# Patient Record
Sex: FEMALE
[De-identification: names, ages, dates, MRNs, and addresses within clinical notes are randomized per-mention and may not be internally consistent; named-entity substitution may affect disease eponyms.]

---

## 2017-08-24 NOTE — CAT SCAN REPORT
FINAL REPORT



EXAM:  CT HEAD/BRAIN WO CON



HISTORY:  dizziness 



TECHNIQUE:  CT head without contrast 



PRIORS:  None.



FINDINGS:  

No acute intra-axial or extra-axial hemorrhage is identified. 

There is no evidence of midline shift or mass effect.  The

ventricles and sulci are within normal limits. Gray-white matter

differentiation is intact. No acute parenchymal abnormalities

seen. 



Bony calvarium is grossly intact.  Visualized portions of the

mastoids and paranasal sinuses are unremarkable. 



IMPRESSION:  

Negative CT head

## 2017-08-24 NOTE — EMERGENCY DEPARTMENT REPORT
HPI





- General


Chief Complaint: Nausea/Vomiting/Diarrhea


Time Seen by Provider: 17 14:27





- HPI


HPI: 


This is a 46-year-old  female presents to the emergency department 

with what she says is now some dizziness, feeling overheated and shakes.  The 

patient was brought in many hours ago by [facility] but then became 

slightly belligerent to triage and said she was leaving and eloped.  She 

recently came back with these current complaints saying that she just wants to 

get some rest.  There was some talk about hallucinations occurring last night 

but currently she denies any auditory or visual hallucinations or any suicidal 

or homicidal ideations.  She does have a history of depression and 

schizophrenia.  She denies any current past medical history.  She does admit to 

drinking some alcohol last night but denies alcoholism.  She does admit to 

using some "street drugs" but says that she has not done it in the past 1-2 

days.








She be noted that later on on reassessment the patient says that a lot has been 

going on in her life on a personal level and it is causing her to be depressed 

and that she admits to suicidal ideations last night and even through this 

morning and afternoon.  When asked if the patient is having current suicidal 

ideations, she says "I am not sure but I know that I need to see my 

psychiatrist soon."





ED Past Medical Hx





- Past Medical History


Hx Heart Attack/AMI: Yes (pt stated)


Hx Diabetes: Yes (gestational)


Hx Seizures: Yes


Hx Psychiatric Treatment: Yes (depression / schizophrenia)





- Surgical History


Hx Breast Surgery: Yes (breast augmentation)


Additional Surgical History: left leg surgery.   x 2





- Social History


Smoking Status: Current Every Day Smoker


Substance Use Type: Alcohol





ED Review of Systems


ROS: 


Stated complaint: HALLUCINATION/VOMITING


Other details as noted in HPI





Comment: All other systems reviewed and negative


Constitutional: chills.  denies: fever


Eyes: denies: eye pain, eye discharge, vision change


ENT: denies: ear pain, throat pain


Respiratory: denies: cough, shortness of breath, wheezing


Cardiovascular: denies: chest pain, palpitations


Gastrointestinal: denies: abdominal pain, nausea, diarrhea


Musculoskeletal: denies: back pain, joint swelling, arthralgia


Skin: denies: rash, lesions


Neurological: other (dizziness).  denies: headache, weakness, paresthesias





Physical Exam





- Physical Exam


Vital Signs: 


 Vital Signs











  17





  10:53 13:07


 


Temperature 97.9 F 97.8 F


 


Pulse Rate 104 H 109 H


 


Respiratory 16 17





Rate  


 


Blood Pressure  112/77


 


Blood Pressure 122/74 





[Left]  


 


O2 Sat by Pulse 99 100





Oximetry  











Physical Exam: 


GENERAL: The patient is well-developed well-nourished.


HENT: Normocephalic.  Atraumatic.    Patient has moist mucous membranes.


EYES: Extraocular motions are intact.  Pupils equal reactive to light 

bilaterally.  No nystagmus.


NECK: Supple.  Trachea is midline.


CHEST/LUNGS: Clear to auscultation.  There is no respiratory distress noted.


HEART/CARDIOVASCULAR: Regular.  There is no tachycardia.  There is no gallop 

rub or murmur.


ABDOMEN: Abdomen is soft, nontender.  Patient has normal bowel sounds.  There 

is no abdominal distention.


SKIN: Skin is warm and dry.


NEURO: The patient is awake, alert, and oriented.  The patient is cooperative.  

The patient has no focal neurologic deficits.  The patient has normal speech 

and gait.  Cranial nerves II through XII grossly intact.


MUSCULOSKELETAL: There is no tenderness or deformity.  There is no limitation 

range of motion.  There is no evidence of acute injury.








ED Course


 Vital Signs











  17





  10:53 13:07


 


Temperature 97.9 F 97.8 F


 


Pulse Rate 104 H 109 H


 


Respiratory 16 17





Rate  


 


Blood Pressure  112/77


 


Blood Pressure 122/74 





[Left]  


 


O2 Sat by Pulse 99 100





Oximetry  














ED Medical Decision Making





- Lab Data


Result diagrams: 


 17 13:15





 17 13:15





- EKG Data


-: EKG Interpreted by Me


EKG shows normal: sinus rhythm, axis, intervals, QRS complexes (q waves to the 

septal / anterior leads), ST-T waves


Rate: tachycardia (108 bpm)





- EKG Data


When compared to previous EKG there are: previous EKG unavailable


Interpretation: other (sinus tach, q waves to the septal / anterior leads)





- Radiology Data


Radiology results: report reviewed


CT of the head does not show any acute intracranial process including no 

ischemia, shift, mass, bleeding or skull fracture.








- Medical Decision Making


46-year-old female presents to the emergency department originally with some 

complaints of dehydration, feeling overheated, nonspecific dizziness.  She was 

found to have some slight alcohol intoxication earlier that had resolved by the 

time it was rechecked this afternoon.  Urine drug screen positive for 

methamphetamine.  The rest the patient's labs are mostly unremarkable and do 

not show any etiology of her symptoms.  She was given Zofran for nausea and 

vomiting, IV fluid resuscitation.  Eventually patient says that she is feeling 

better and she is hungry and was able to eat something without having any 

further nausea or vomiting.  However when I went to reassess the patient she 

began speaking of her depression and suicidal ideations.  For this reason the 

patient was made a 1013.  She still complained of some nonspecific dizziness so 

a CT scan of the head was done without contrast resulted as a negative 

examination without any signs of bleed, shift, ischemia or any other acute 

process.  Vital signs stable throughout her ED course.  At this point I 

consider the patient to be medically cleared for psychiatric placement.








- Differential Diagnosis


bipolar disorder, schizophrenia, depression, substance abuse, dehydration


Critical Care Time: No


Critical care attestation.: 


If time is entered above; I have spent that time in minutes in the direct care 

of this critically ill patient, excluding procedure time.








ED Disposition


Clinical Impression: 


 Suicidal ideations





Depression


Qualifiers:


 Depression Type: unspecified Qualified Code(s): F32.9 - Major depressive 

disorder, single episode, unspecified





Disposition: DC/TX-[facility] PSY HOSP/PSY UNIT


Is pt being admited?: No


Condition: Stable


Referrals: 


PRIMARY CARE,MD [Primary Care Provider] - 3-5 Days


Time of Disposition: 19:38

## 2017-08-26 NOTE — CONSULTATION
History of Present Illness





- Reason for Consult


Consult date: 08/26/17


Reason for consult: psychiatric evaluation





Medications and Allergies


 Allergies











Allergy/AdvReac Type Severity Reaction Status Date / Time


 


No Known Allergies Allergy   Verified 08/24/17 13:01











 Home Medications











 Medication  Instructions  Recorded  Confirmed  Last Taken  Type


 


hydrOXYzine PAMOATE [Vistaril] 100 mg PO BID 08/25/17 08/25/17 Unknown History


 


risperiDONE [RisperDAL] 2 mg PO QHS 08/25/17 08/25/17 Unknown History











Active Meds: 


Active Medications





Risperidone (Risperdal)  2 mg PO [facility]











Mental Status Exam





- Vital signs


 Last Vital Signs











Temp  98 F   08/26/17 15:11


 


Pulse  88   08/26/17 15:11


 


Resp  18   08/26/17 15:11


 


BP  134/70   08/26/17 15:11


 


Pulse Ox  98   08/26/17 15:11














Results


Result Diagrams: 


 08/24/17 13:15





 08/24/17 13:15


All other labs normal.








Assessment and Plan


Assessment and plan: 


"I need detox."


The patient is a 46-year-old female presenting with a chief complaint of 

suicidal ideation with a hx of substance abuse. She has presented to [facility] more than once in the past 6 months for the same presentation. These 

records are found under a different medical record number. She admits to 

polysubstance use, including heroin, methamphetamine, xanax, and historically 

cocaine. She is tangential and irritable. She reports vomiting yesterday. Her 

primary complaints are sweating, mood swings, and tremors. She reports a 

history of seizures and states at the time she had a low sodium level. She 

reports suicidal ideation. No psychotic symptoms present. She wants to be on 

detox medication and schizophrenia medications. She states the schizophrenia 

medicines have never really worked. Nonetheless, she would like to restart them

, risperdal and vistaril. She wants to be on tranxene and suboxone. 











Past psychiatric history





- Past Medical History


Past Medical History: No medical history, other (Elevated Ch)


Past Surgical History: No surgical history





- past Psychiatric treatment and history


Psych: Bipolar, Depression





- Social History


Social history: staying in [place] and comes to [place] to see her 

boyfriend





Mental Status Exam








- Exam


Narrative exam: 








MSE: 





 Appearance: calm, uncooperative 


 Behavior: good eye contact


 Speech: pressured, loud


 Mood: agitated 


 Affect: congruent


 Thought Process: tangential


 Thought Content: she reports suicidal ideation. denies HI's and AVH's


 Motor Activity: ambulatory 


 Cognition: A&O x 3


 Insight: limited


 Judgment: poor








Assessment and plan: 


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 The patient is a 46-year-old 

female presenting with a chief complaint of suicidal ideation and wants detox 

for substance abuse. She is not exhibiting signs of benzo withdrawal. Her vital 

signs have been within normal limits. She has subjective complaints of 

withdrawal symptoms. She does not appear to be in physical distress. CIWA is 

not indicated at this time. 





DD: R/O Bipolar, Depressive DO





Recommendation/Plan: Continue 1013 with placement to inpatient psy services. 

She wants to go to [facility]. She is on the [facility] board and has been informed 

she is not able to go to [facility]. She has been denied there due to past 

behavioral issues.


Start risperdal 2mg hs for mood


start vistaril 50mg tid for complaints of anxiety.

## 2017-08-27 NOTE — PROGRESS NOTE
Subjective





- Reason for Consult


Consult date: 08/27/17


Reason for consult: follow up





Mental Status Exam





- Vital signs


 Last Vital Signs











Temp  98.3 F   08/27/17 07:48


 


Pulse  85   08/27/17 07:48


 


Resp  16   08/27/17 07:49


 


BP  120/78   08/27/17 07:48


 


Pulse Ox  96   08/27/17 07:49














Assessment and Plan


"I need detox."


The patient is a 46-year-old female initial presented with a chief complaint of 

suicidal ideation and requesting detox from xanax and heroin. She has presented 

to [facility] more than once in the past 6 months for the same 

presentation. These records are found under a different medical record number. 

She admits to polysubstance use, including heroin, methamphetamine, xanax, and 

historically cocaine. Today, she is perseverative about being discharged since 

she cannot go to [facility]. She denies SI/HI/AVH. She states she wants rehab. 

She has not required detox from benzos/alcohol/opiates. She is not in acute 

distress. No physical complaints voiced.





Mental Status Exam








- Exam


Narrative exam: 








MSE: 





 Appearance: calm, uncooperative 


 Behavior: good eye contact


she is perseverative about discharge/demanding at times.


 Speech: regular rate and rhythm


 Mood: irritable


 Affect: congruent


 Thought Process: logical/perseverative


 Thought Content:  denies SI/HI's and AVH's


 Motor Activity: ambulatory 


 Cognition: A&O x 3


 Insight: limited


 Judgment: limited








Assessment and plan: 


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 The patient is a 46-year-old 

female presenting with a chief complaint of suicidal ideation and wants detox 

for substance abuse. She is not exhibiting signs of benzo withdrawal. Her vital 

signs have been within normal limits. 





DD: R/O Bipolar, Depressive DO





Recommendation/Plan: 


continue risperdal 2mg hs for mood


Continue vistaril 50mg tid for complaints of anxiety.








At the time of interview, Ms. Valente does not present as an imminent risk of 

harm to herself or others. She has goal oriented thinking and plans to follow 

up at the [facility] for therapy and medication management. She currently 

does not meet inpatient criteria. She would be appropriate for PHP or [facility] 

services. 





Initial reports of suicidality have not been consistent. Her goal to receive 

treatment for polysubstance use, particularly withdrawal. She is not exhibiting 

signs of withdrawal. She current denies SI/HI. Initial reports are consistent 

with attempts to gain access to inpatient services for alternate services/

substance use services/rehab placement. She had the same presentation 4 times 

since 2/2017.





Consequently, she is no longer a danger to her self and she has consistently 

verbalized thoughts about her future. She plans on going to the [facility] tomorrow. She understands that cocaine, meth, xanax, heroin, and cannabis 

have a deleterious effect on her mental health and she is in the pre-

contemplative stages of quitting. The 1013 has been rescinded and she can 

discharge back in the community with outpatient resources.

## 2018-01-28 NOTE — EMERGENCY DEPARTMENT REPORT
ED ENT HPI





- General


Chief complaint: Earache


Stated complaint: FEVER,LEFT EAR PAIN


Time Seen by Provider: 18 07:14


Source: patient


Mode of arrival: Ambulatory


Limitations: No Limitations





- History of Present Illness


Initial comments: 





This is a 47-year-old female nontoxic, well nourished in appearance, no acute 

signs of distress presents to the ED with c/o of left earache x2 days.  Patient 

is also complaining about chronic intermittent left knee pain status post knee 

surgery 2 years ago.  Patient denies any trauma to the knee.  Patient stated 

pain is aching with level of 8 out of 10.  Patient denies decreased hearing, 

mastoid tenderness, fever, chills, nausea, vomiting, chest pain, shortness of 

breath, headache or stiff neck.  Patient denies any calf pain or calf 

tenderness.  Denies any recent travels, long car rides, or recent hospital 

stays.  Patient denies any numbness or tingling.  Denies any abdominal pain or 

back pain.  Patient denies any drug allergies.  Past medical history includes 

diabetes, MI, psychiatric.


MD complaint: ear pain


-: days(s) (2)


Location: L ear


Severity: mild


Severity scale (0 -10): 8


Quality: aching


Consistency: constant


Improves with: none


Worsens with: none


Associated Symptoms: denies: fever, cough, gum swelling, toothache, pain with 

swallowing, sore throat, tinnitus, hearing loss, discharge from ear, rhinorrhea





- Related Data


 Home Medications











 Medication  Instructions  Recorded  Confirmed  Last Taken


 


hydrOXYzine PAMOATE [Vistaril] 100 mg PO BID 17 Unknown


 


risperiDONE [RisperDAL] 2 mg PO QHS 17 Unknown








 Previous Rx's











 Medication  Instructions  Recorded  Last Taken  Type


 


Sulfamethoxazole/Trimethoprim 1 each PO BID #14 tablet 17 Unknown Rx





[Bactrim DS TAB]    


 


Amoxicillin/K Clav Tab [Augmentin 1 tab PO Q12HR #20 tab 18 Unknown Rx





875 mg]    


 


Ciprofloxacin 0.2%(Nf) 4 drops AS TID 7 Days  droperette 18 Unknown Rx





[Ciprofloxacin Otic 0.2%(Nf)]    











 Allergies











Allergy/AdvReac Type Severity Reaction Status Date / Time


 


No Known Allergies Allergy   Verified 17 13:01














ED Dental HPI





- General


Chief complaint: Earache


Stated complaint: FEVER,LEFT EAR PAIN


Time Seen by Provider: 18 07:14


Source: patient


Mode of arrival: Ambulatory


Limitations: No Limitations





- Related Data


 Home Medications











 Medication  Instructions  Recorded  Confirmed  Last Taken


 


hydrOXYzine PAMOATE [Vistaril] 100 mg PO BID 17 Unknown


 


risperiDONE [RisperDAL] 2 mg PO QHS 17 Unknown








 Previous Rx's











 Medication  Instructions  Recorded  Last Taken  Type


 


Sulfamethoxazole/Trimethoprim 1 each PO BID #14 tablet 17 Unknown Rx





[Bactrim DS TAB]    


 


Amoxicillin/K Clav Tab [Augmentin 1 tab PO Q12HR #20 tab 18 Unknown Rx





875 mg]    


 


Ciprofloxacin 0.2%(Nf) 4 drops AS TID 7 Days  droperette 18 Unknown Rx





[Ciprofloxacin Otic 0.2%(Nf)]    











 Allergies











Allergy/AdvReac Type Severity Reaction Status Date / Time


 


No Known Allergies Allergy   Verified 17 13:01














ED Review of Systems


ROS: 


Stated complaint: FEVER,LEFT EAR PAIN


Other details as noted in HPI





Constitutional: denies: chills, fever


Eyes: denies: eye pain, eye discharge, vision change


ENT: ear pain.  denies: throat pain


Respiratory: denies: cough, shortness of breath, wheezing


Cardiovascular: denies: chest pain, palpitations


Endocrine: no symptoms reported


Gastrointestinal: denies: abdominal pain, nausea, diarrhea


Genitourinary: denies: urgency, dysuria, discharge


Musculoskeletal: arthralgia.  denies: back pain, joint swelling


Skin: denies: rash, lesions


Neurological: denies: headache, weakness, paresthesias


Psychiatric: denies: anxiety, depression


Hematological/Lymphatic: denies: easy bleeding, easy bruising





ED Past Medical Hx





- Past Medical History


Hx Heart Attack/AMI: Yes (pt stated)


Hx Diabetes: Yes (gestational)


Hx Seizures: Yes


Hx Psychiatric Treatment: Yes (depression / schizophrenia)


Additional medical history: High cholesterol





- Surgical History


Hx Breast Surgery: Yes (breast augmentation)


Additional Surgical History: left leg surgery.   x 2





- Social History


Smoking Status: Current Every Day Smoker


Substance Use Type: None





- Medications


Home Medications: 


 Home Medications











 Medication  Instructions  Recorded  Confirmed  Last Taken  Type


 


hydrOXYzine PAMOATE [Vistaril] 100 mg PO BID 17 Unknown History


 


risperiDONE [RisperDAL] 2 mg PO QHS 17 Unknown History


 


Sulfamethoxazole/Trimethoprim 1 each PO BID #14 tablet 17  Unknown Rx





[Bactrim DS TAB]     


 


Amoxicillin/K Clav Tab [Augmentin 1 tab PO Q12HR #20 tab 18  Unknown Rx





875 mg]     


 


Ciprofloxacin 0.2%(Nf) 4 drops AS TID 7 Days  droperette 18  Unknown Rx





[Ciprofloxacin Otic 0.2%(Nf)]     














ED Physical Exam





- General


Limitations: No Limitations


General appearance: alert, in no apparent distress





- Head


Head exam: Present: atraumatic, normocephalic, normal inspection





- Eye


Eye exam: Present: normal appearance, PERRL, EOMI.  Absent: scleral icterus, 

conjunctival injection, nystagmus, periorbital swelling, periorbital tenderness


Pupils: Present: normal accommodation





- ENT


ENT exam: Present: normal orophraynx, mucous membranes moist





- Expanded ENT Exam


  ** Expanded


Ear exam: Present: normal external inspection


TM/Canal exam: Erythema: Left TM, Bulging: Left TM (with tragus pain)


Mouth exam: Present: normal external inspection, tongue normal.  Absent: 

drooling, trismus, muffled voice, tongue elevation, laceration


Teeth exam: Present: normal inspection


Throat exam: Positive: normal inspection.  Negative: tonsillar erythema, 

tonsillomegaly, tonsillar exudate, R peritonsillar mass, L peritonsillar mass





- Neck


Neck exam: Present: normal inspection, full ROM.  Absent: tenderness, 

meningismus, lymphadenopathy, thyromegaly





- Respiratory


Respiratory exam: Present: normal lung sounds bilaterally.  Absent: respiratory 

distress, wheezes, rales, rhonchi, stridor, chest wall tenderness, accessory 

muscle use, decreased breath sounds, prolonged expiratory





- Cardiovascular


Cardiovascular Exam: Present: regular rate, normal rhythm, normal heart sounds.

  Absent: irregular rhythm, systolic murmur, diastolic murmur, rubs, gallop





- GI/Abdominal


GI/Abdominal exam: Present: soft, normal bowel sounds.  Absent: distended, 

tenderness, guarding, rebound, rigid, diminished bowel sounds





- Rectal


Rectal exam: Present: deferred





- Extremities Exam


Extremities exam: Present: normal inspection, full ROM, tenderness, normal 

capillary refill.  Absent: pedal edema, joint swelling, calf tenderness





- Expanded Lower Extremity Exam


  ** Left


Hip exam: Present: normal inspection, full ROM


Upper Leg exam: Present: normal inspection, full ROM


Knee exam: Present: normal inspection, full ROM, tenderness, full knee 

extension.  Absent: swelling, abrasion, laceration, ecchymosis, deformity, 

crepidus, dislocation, erythema, effusion, pain w/ pronation/supination, 

posterior draw sign, pain/laxity with valgus, pain/laxity with varus


Lower Leg exam: Present: normal inspection, full ROM.  Absent: tenderness, 

swelling, abrasion, laceration, ecchymosis, deformity, crepidus, dislocation, 

erythema, palpable cord, Nieves's sign


Ankle exam: Present: normal inspection, full ROM.  Absent: tenderness, swelling

, abrasion, laceration, ecchymosis, deformity, crepidus, dislocation, erythema, 

anterior draw sign


Foot/Toe exam: Present: normal inspection, full ROM


Neuro vascular tendon exam: Present: no vascular compromise.  Absent: pulse 

deficit, abnormal cap refill, motor deficit, sensory deficit, tendon deficit, 

extremity cold to touch, pallor, abnormal 2-point discrimination, decreased fine

/light touch, foot drop, peroneal nerve deficit, significant pain with passive 

ROM of distal joint


Gait: Positive: observed and normal





- Back Exam


Back exam: Present: normal inspection, full ROM.  Absent: tenderness, CVA 

tenderness (R), CVA tenderness (L), muscle spasm, paraspinal tenderness, 

vertebral tenderness, rash noted





- Neurological Exam


Neurological exam: Present: alert, oriented X3, CN II-XII intact, normal gait, 

reflexes normal





- Psychiatric


Psychiatric exam: Present: normal affect, normal mood





- Skin


Skin exam: Present: warm, dry, intact, normal color.  Absent: rash





ED Course





 Vital Signs











  18





  05:55 06:07


 


Temperature 98.1 F 98.1 F


 


Pulse Rate 111 H 81


 


Respiratory 18 18





Rate  


 


Blood Pressure 169/68 169/68


 


O2 Sat by Pulse 97 99





Oximetry  














- Reevaluation(s)


Reevaluation #1: 





18 07:57


Patient is speaking in full sentences with no signs of distress noted.





ED Medical Decision Making





- Medical Decision Making





This is a 47-year-old female that presents with chronic intermittent left knee 

pain, left otitis media and otitis externa.  Patient was stable and was 

examined by me.  The patient received Motrin 800 milligrams by mouth in the ED 

which patient stated his symptoms of knee pain is improving and is subsiding.  

Patient is discharged with Augmentin and ciprofloxacin otic.  There is no 

mastoid tenderness.  No mastoid erythema.  Negative calf tenderness or calf 

pain.  Patient was instructed Follow-up with a ENT/orthopedic doctor in 24 

hours or if symptoms worsen and continue return to emergency room as soon as 

possible.  At time time of discharge, the patient does not seem toxic or ill in 

appearance.  No acute signs of distress noted.  Patient agrees to discharge 

treatment plan of care.  No further questions noted by the patient.


Critical care attestation.: 


If time is entered above; I have spent that time in minutes in the direct care 

of this critically ill patient, excluding procedure time.








ED Disposition


Clinical Impression: 


Otitis media


Qualifiers:


 Otitis media type: unspecified Laterality: left Qualified Code(s): H66.92 - 

Otitis media, unspecified, left ear





Otitis externa


Qualifiers:


 Otitis externa type: unspecified type Chronicity: acute Laterality: left 

Qualified Code(s): H60.502 - Unspecified acute noninfective otitis externa, 

left ear





Left knee pain


Qualifiers:


 Chronicity: chronic Qualified Code(s): M25.562 - Pain in left knee; G89.29 - 

Other chronic pain; G89.29 - Other chronic pain





Disposition: DC-01 TO HOME OR SELFCARE


Is pt being admited?: No


Does the pt Need Aspirin: No


Condition: Stable


Instructions:  Earache (ED), Knee Pain (ED), Ibuprofen (By mouth), Amoxicillin/

Clavulanate Potassium (By mouth), Ciprofloxacin (Into the ear)


Additional Instructions: 


Follow-up with a ENT/orthopedic doctor in 24 hours or if symptoms worsen and 

continue return to emergency room as soon as possible.  At time time of 

discharge, the patient does not seem toxic or ill in appearance. 


Prescriptions: 


Amoxicillin/K Clav Tab [Augmentin 875 mg] 1 tab PO Q12HR #20 tab


Ciprofloxacin 0.2%(Nf) [Ciprofloxacin Otic 0.2%(Nf)] 4 drops AS TID 7 Days  

droperette


Referrals: 


[facility],MD [Primary Care Provider] - 24 Hours


[facility] [Outside] - 3-5 Days


[facility] [Outside] - 3-5 Days


PARIS BRIZUELA MD [Staff Physician] - 24 Hours


TABBY CISNEROS MD [Staff Physician] - 24 Hours


Forms:  Work/School Release Form(ED)

## 2018-03-14 NOTE — EMERGENCY DEPARTMENT REPORT
History of Present Illness





- General


Chief Complaint: Overdose


Stated Complaint: POSS OD


Time Seen by Provider: 18 23:39


Source: patient, EMS


Mode of arrival: Stretcher


Limitations: No Limitations





- History of Present Illness


Initial Comments: 





47-year-old female with a asthma previously schizophrenia, depression, seizures

, and elevated cholesterol and a questionable cardiac history presents to the 

hospital with suicidal ideation with attempt prior to arrival.  Patient called 

EMS temperature at 22:30.  Warned that if she took a handful of unknown pills 

at 21:30.  Patient thinks the tablets were Risperdal but they were not her 

pills.  Patient initially belligerent and accusing me of not wanting to treat 

her and that I can be trialed for murder if she dies.  Also stated she wants to 

go to another hospital.  This response was in response to questioning about her 

history of present illness and current symptoms.  Patient does not want to 

discuss why she is suicidal states she was psychiatrist about that.  She 

complains of mid chest pain that started after she received blood draw via 

needle.





- Related Data


 Home Medications











 Medication  Instructions  Recorded  Confirmed  Last Taken


 


hydrOXYzine PAMOATE [Vistaril] 100 mg PO BID 17 Unknown


 


risperiDONE [RisperDAL] 2 mg PO QHS 17 Unknown








 Previous Rx's











 Medication  Instructions  Recorded  Last Taken  Type


 


Sulfamethoxazole/Trimethoprim 1 each PO BID #14 tablet 17 Unknown Rx





[Bactrim DS TAB]    


 


Amoxicillin/K Clav Tab [Augmentin 1 tab PO Q12HR #20 tab 18 Unknown Rx





875 mg]    


 


Ciprofloxacin 0.2%(Nf) 4 drops AS TID 7 Days  droperette 18 Unknown Rx





[Ciprofloxacin Otic 0.2%(Nf)]    











 Allergies











Allergy/AdvReac Type Severity Reaction Status Date / Time


 


No Known Allergies Allergy   Verified 17 13:01














ED Review of Systems


ROS: 


Stated complaint: POSS OD


Other details as noted in HPI





Comment: All other systems reviewed and negative


Other: 





Constitutional: No fevers chills 


Eyes: No eye pain visual changes 


ENT: No ear pain or throat pain


Neck: Denies pain


Respiratory: Denies cough wheezing shortness of breath 


Cardiovascular: Denies palpitations, syncope


GI: Denies abdominal pain, nausea, vomiting, diarrhea


: Denies dysuria


Musculoskeletal: Denies back pain


Skin: Denies rash, lesions, erythema


Neurologic: Denies headache, numbness, weakness


Psychiatric: Denies suicidal ideation, hallucinations








ED Past Medical Hx





- Past Medical History


Hx Heart Attack/AMI: Yes (pt stated)


Hx Diabetes: Yes (gestational)


Hx Seizures: Yes


Hx Psychiatric Treatment: Yes (depression / schizophrenia)


Additional medical history: High cholesterol





- Surgical History


Hx Coronary Stent: Yes (patient states)


Hx Breast Surgery: Yes (breast augmentation)


Additional Surgical History: left leg surgery.   x 2





- Social History


Smoking Status: Current Every Day Smoker





- Medications


Home Medications: 


 Home Medications











 Medication  Instructions  Recorded  Confirmed  Last Taken  Type


 


hydrOXYzine PAMOATE [Vistaril] 100 mg PO BID 17 Unknown History


 


risperiDONE [RisperDAL] 2 mg PO QHS 17 Unknown History


 


Sulfamethoxazole/Trimethoprim 1 each PO BID #14 tablet 17  Unknown Rx





[Bactrim DS TAB]     


 


Amoxicillin/K Clav Tab [Augmentin 1 tab PO Q12HR #20 tab 18  Unknown Rx





875 mg]     


 


Ciprofloxacin 0.2%(Nf) 4 drops AS TID 7 Days  droperette 18  Unknown Rx





[Ciprofloxacin Otic 0.2%(Nf)]     














ED Physical Exam





- General


Limitations: No Limitations





- Other


Other exam information: 





General: No limitations, patient is alert in no acute distress


Head exam: Atraumatic, normocephalic


Eyes exam: Normal appearance


ENT: Moist mucous membrane, normal oropharynx


Neck exam: Normal inspection, full range of motion, no meningismus nontender


Respiratory exam: Clear to auscultation bilateral, no wheezes, rales, crackles


Cardiovascular: Normal rate and rhythm, normal heart sounds.  Chest wall 

nontender


Abdomen: Soft, nondistended, and  nontender, with normal bowel sounds, no 

rebound, or guarding


Extremity: Full range of motion normal inspection no deformity, no calf 

tenderness or edema


Back: Normal Inspection, full range of motion, no tenderness


Neurologic: Alert, oriented x3, cranial nerves intact, no motor or sensory 

deficit


Psychiatric: Depressed affect, agitated


Skin: Warm, dry, intact





ED Course


 Vital Signs











  18





  23:04 23:14 23:16


 


Temperature  98 F 


 


Pulse Rate 101 H 100 H 98 H


 


Respiratory 19 18 18





Rate   


 


Blood Pressure  120/51 120/51


 


O2 Sat by Pulse  98 92





Oximetry   














  18





  23:30 23:46 00:00


 


Temperature   


 


Pulse Rate 96 H 96 H 102 H


 


Respiratory 20 21 27 H





Rate   


 


Blood Pressure 120/51 126/58 121/60


 


O2 Sat by Pulse 98 97 100





Oximetry   














  18





  00:16 00:30 00:45


 


Temperature   


 


Pulse Rate 95 H 96 H 98 H


 


Respiratory 24 22 19





Rate   


 


Blood Pressure 121/60 115/52 115/52


 


O2 Sat by Pulse 98 100 100





Oximetry   














  18





  01:00 01:15 01:30


 


Temperature   


 


Pulse Rate 98 H 100 H 98 H


 


Respiratory 22 16 13





Rate   


 


Blood Pressure 120/53 120/53 126/70


 


O2 Sat by Pulse   





Oximetry   














- Reevaluation(s)


Reevaluation #1: 





18 05:58


Patient has been in the ER for over 6 hours without any symptoms of acute 

intoxication.





- Consultations


Consultation #1: 





18 02:22


 evaluation requested. Spoke with [facility] Medical Decision Making





- Lab Data


Result diagrams: 


 18 23:16





 18 23:16








 Lab Results











  18 Range/Units





  00:00 23:16 23:16 


 


WBC     (4.5-11.0)  K/mm3


 


RBC     (3.65-5.03)  M/mm3


 


Hgb     (10.1-14.3)  gm/dl


 


Hct     (30.3-42.9)  %


 


MCV     (79-97)  fl


 


MCH     (28-32)  pg


 


MCHC     (30-34)  %


 


RDW     (13.2-15.2)  %


 


Plt Count     (140-440)  K/mm3


 


Lymph % (Auto)     (13.4-35.0)  %


 


Mono % (Auto)     (0.0-7.3)  %


 


Eos % (Auto)     (0.0-4.3)  %


 


Baso % (Auto)     (0.0-1.8)  %


 


Lymph #     (1.2-5.4)  K/mm3


 


Mono #     (0.0-0.8)  K/mm3


 


Eos #     (0.0-0.4)  K/mm3


 


Baso #     (0.0-0.1)  K/mm3


 


Seg Neutrophils %     (40.0-70.0)  %


 


Seg Neutrophils #     (1.8-7.7)  K/mm3


 


Sodium     (137-145)  mmol/L


 


Potassium     (3.6-5.0)  mmol/L


 


Chloride     ()  mmol/L


 


Carbon Dioxide     (22-30)  mmol/L


 


Anion Gap     mmol/L


 


BUN     (7-17)  mg/dL


 


Creatinine     (0.7-1.2)  mg/dL


 


Estimated GFR     ml/min


 


BUN/Creatinine Ratio     %


 


Glucose     ()  mg/dL


 


Calcium     (8.4-10.2)  mg/dL


 


Phosphorus     (2.5-4.5)  mg/dL


 


Magnesium     (1.7-2.3)  mg/dL


 


Total Bilirubin  0.20    (0.1-1.2)  mg/dL


 


Direct Bilirubin  < 0.2    (0-0.2)  mg/dL


 


Indirect Bilirubin  0.0    mg/dL


 


AST  15    (5-40)  units/L


 


ALT  9    (7-56)  units/L


 


Alkaline Phosphatase  85    ()  units/L


 


Total Creatine Kinase     ()  units/L


 


CK-MB (CK-2)     (0.0-4.0)  ng/mL


 


CK-MB (CK-2) Rel Index     (0-4)  


 


Troponin T     (0.00-0.029)  ng/mL


 


Total Protein  7.1    (6.3-8.2)  g/dL


 


Albumin  3.8 L    (3.9-5)  g/dL


 


Albumin/Globulin Ratio  1.2    %


 


HCG, Qual     (Negative)  


 


Urine Color     (Yellow)  


 


Urine Turbidity     (Clear)  


 


Urine pH     (5.0-7.0)  


 


Ur Specific Gravity     (1.003-1.030)  


 


Urine Protein     (Negative)  mg/dL


 


Urine Glucose (UA)     (Negative)  mg/dL


 


Urine Ketones     (Negative)  mg/dL


 


Urine Blood     (Negative)  


 


Urine Nitrite     (Negative)  


 


Urine Bilirubin     (Negative)  


 


Urine Urobilinogen     (<2.0)  mg/dL


 


Ur Leukocyte Esterase     (Negative)  


 


Urine WBC (Auto)     (0.0-6.0)  /HPF


 


Urine RBC (Auto)     (0.0-6.0)  /HPF


 


U Epithel Cells (Auto)     (0-13.0)  /HPF


 


Urine Bacteria (Auto)     (Negative)  /HPF


 


Amorphous Crystals     


 


Salicylates   < 0.3 L   (2.8-20.0)  mg/dL


 


Urine Opiates Screen     


 


Urine Methadone Screen     


 


Acetaminophen    < 5.0 L  (10.0-30.0)  ug/mL


 


Ur Barbiturates Screen     


 


Ur Phencyclidine Scrn     


 


Ur Amphetamines Screen     


 


U Benzodiazepines Scrn     


 


Urine Cocaine Screen     


 


U Marijuana (THC) Screen     


 


Drugs of Abuse Note     


 


Plasma/Serum Alcohol     (0-0.07)  %














  18 Range/Units





  23:16 23:16 23:16 


 


WBC     (4.5-11.0)  K/mm3


 


RBC     (3.65-5.03)  M/mm3


 


Hgb     (10.1-14.3)  gm/dl


 


Hct     (30.3-42.9)  %


 


MCV     (79-97)  fl


 


MCH     (28-32)  pg


 


MCHC     (30-34)  %


 


RDW     (13.2-15.2)  %


 


Plt Count     (140-440)  K/mm3


 


Lymph % (Auto)     (13.4-35.0)  %


 


Mono % (Auto)     (0.0-7.3)  %


 


Eos % (Auto)     (0.0-4.3)  %


 


Baso % (Auto)     (0.0-1.8)  %


 


Lymph #     (1.2-5.4)  K/mm3


 


Mono #     (0.0-0.8)  K/mm3


 


Eos #     (0.0-0.4)  K/mm3


 


Baso #     (0.0-0.1)  K/mm3


 


Seg Neutrophils %     (40.0-70.0)  %


 


Seg Neutrophils #     (1.8-7.7)  K/mm3


 


Sodium  138    (137-145)  mmol/L


 


Potassium  4.1    (3.6-5.0)  mmol/L


 


Chloride  97.9 L    ()  mmol/L


 


Carbon Dioxide  28    (22-30)  mmol/L


 


Anion Gap  16    mmol/L


 


BUN  10    (7-17)  mg/dL


 


Creatinine  0.6 L    (0.7-1.2)  mg/dL


 


Estimated GFR  > 60    ml/min


 


BUN/Creatinine Ratio  17    %


 


Glucose  89    ()  mg/dL


 


Calcium  8.5    (8.4-10.2)  mg/dL


 


Phosphorus     (2.5-4.5)  mg/dL


 


Magnesium     (1.7-2.3)  mg/dL


 


Total Bilirubin     (0.1-1.2)  mg/dL


 


Direct Bilirubin     (0-0.2)  mg/dL


 


Indirect Bilirubin     mg/dL


 


AST     (5-40)  units/L


 


ALT     (7-56)  units/L


 


Alkaline Phosphatase     ()  units/L


 


Total Creatine Kinase     ()  units/L


 


CK-MB (CK-2)     (0.0-4.0)  ng/mL


 


CK-MB (CK-2) Rel Index     (0-4)  


 


Troponin T     (0.00-0.029)  ng/mL


 


Total Protein     (6.3-8.2)  g/dL


 


Albumin     (3.9-5)  g/dL


 


Albumin/Globulin Ratio     %


 


HCG, Qual    Negative  (Negative)  


 


Urine Color     (Yellow)  


 


Urine Turbidity     (Clear)  


 


Urine pH     (5.0-7.0)  


 


Ur Specific Gravity     (1.003-1.030)  


 


Urine Protein     (Negative)  mg/dL


 


Urine Glucose (UA)     (Negative)  mg/dL


 


Urine Ketones     (Negative)  mg/dL


 


Urine Blood     (Negative)  


 


Urine Nitrite     (Negative)  


 


Urine Bilirubin     (Negative)  


 


Urine Urobilinogen     (<2.0)  mg/dL


 


Ur Leukocyte Esterase     (Negative)  


 


Urine WBC (Auto)     (0.0-6.0)  /HPF


 


Urine RBC (Auto)     (0.0-6.0)  /HPF


 


U Epithel Cells (Auto)     (0-13.0)  /HPF


 


Urine Bacteria (Auto)     (Negative)  /HPF


 


Amorphous Crystals     


 


Salicylates     (2.8-20.0)  mg/dL


 


Urine Opiates Screen     


 


Urine Methadone Screen     


 


Acetaminophen     (10.0-30.0)  ug/mL


 


Ur Barbiturates Screen     


 


Ur Phencyclidine Scrn     


 


Ur Amphetamines Screen     


 


U Benzodiazepines Scrn     


 


Urine Cocaine Screen     


 


U Marijuana (THC) Screen     


 


Drugs of Abuse Note     


 


Plasma/Serum Alcohol   0.02   (0-0.07)  %














  18 Range/Units





  23:16 23:48 23:48 


 


WBC  6.3    (4.5-11.0)  K/mm3


 


RBC  3.79    (3.65-5.03)  M/mm3


 


Hgb  10.9    (10.1-14.3)  gm/dl


 


Hct  33.5    (30.3-42.9)  %


 


MCV  88    (79-97)  fl


 


MCH  29    (28-32)  pg


 


MCHC  33    (30-34)  %


 


RDW  14.5    (13.2-15.2)  %


 


Plt Count  470 H    (140-440)  K/mm3


 


Lymph % (Auto)  17.2    (13.4-35.0)  %


 


Mono % (Auto)  7.7 H    (0.0-7.3)  %


 


Eos % (Auto)  0.9    (0.0-4.3)  %


 


Baso % (Auto)  0.5    (0.0-1.8)  %


 


Lymph #  1.1 L    (1.2-5.4)  K/mm3


 


Mono #  0.5    (0.0-0.8)  K/mm3


 


Eos #  0.1    (0.0-0.4)  K/mm3


 


Baso #  0.0    (0.0-0.1)  K/mm3


 


Seg Neutrophils %  73.7 H    (40.0-70.0)  %


 


Seg Neutrophils #  4.6    (1.8-7.7)  K/mm3


 


Sodium     (137-145)  mmol/L


 


Potassium     (3.6-5.0)  mmol/L


 


Chloride     ()  mmol/L


 


Carbon Dioxide     (22-30)  mmol/L


 


Anion Gap     mmol/L


 


BUN     (7-17)  mg/dL


 


Creatinine     (0.7-1.2)  mg/dL


 


Estimated GFR     ml/min


 


BUN/Creatinine Ratio     %


 


Glucose     ()  mg/dL


 


Calcium     (8.4-10.2)  mg/dL


 


Phosphorus     (2.5-4.5)  mg/dL


 


Magnesium     (1.7-2.3)  mg/dL


 


Total Bilirubin     (0.1-1.2)  mg/dL


 


Direct Bilirubin     (0-0.2)  mg/dL


 


Indirect Bilirubin     mg/dL


 


AST     (5-40)  units/L


 


ALT     (7-56)  units/L


 


Alkaline Phosphatase     ()  units/L


 


Total Creatine Kinase     ()  units/L


 


CK-MB (CK-2)     (0.0-4.0)  ng/mL


 


CK-MB (CK-2) Rel Index     (0-4)  


 


Troponin T     (0.00-0.029)  ng/mL


 


Total Protein     (6.3-8.2)  g/dL


 


Albumin     (3.9-5)  g/dL


 


Albumin/Globulin Ratio     %


 


HCG, Qual     (Negative)  


 


Urine Color   Yellow   (Yellow)  


 


Urine Turbidity   Turbid   (Clear)  


 


Urine pH   5.0   (5.0-7.0)  


 


Ur Specific Gravity   1.026   (1.003-1.030)  


 


Urine Protein   <15 mg/dl   (Negative)  mg/dL


 


Urine Glucose (UA)   Neg   (Negative)  mg/dL


 


Urine Ketones   Neg   (Negative)  mg/dL


 


Urine Blood   Mod   (Negative)  


 


Urine Nitrite   Neg   (Negative)  


 


Urine Bilirubin   Neg   (Negative)  


 


Urine Urobilinogen   2.0   (<2.0)  mg/dL


 


Ur Leukocyte Esterase   Tr   (Negative)  


 


Urine WBC (Auto)   < 1.0   (0.0-6.0)  /HPF


 


Urine RBC (Auto)   25.0   (0.0-6.0)  /HPF


 


U Epithel Cells (Auto)   7.0   (0-13.0)  /HPF


 


Urine Bacteria (Auto)   2+   (Negative)  /HPF


 


Amorphous Crystals   3+   


 


Salicylates     (2.8-20.0)  mg/dL


 


Urine Opiates Screen    Presumptive negative  


 


Urine Methadone Screen    Presumptive negative  


 


Acetaminophen     (10.0-30.0)  ug/mL


 


Ur Barbiturates Screen    Presumptive negative  


 


Ur Phencyclidine Scrn    Presumptive negative  


 


Ur Amphetamines Screen    Presumptive positive  


 


U Benzodiazepines Scrn    Presumptive negative  


 


Urine Cocaine Screen    Presumptive negative  


 


U Marijuana (THC) Screen    Presumptive negative  


 


Drugs of Abuse Note    Disclamer  


 


Plasma/Serum Alcohol     (0-0.07)  %














  18 Range/Units





  00:07 00:53 


 


WBC    (4.5-11.0)  K/mm3


 


RBC    (3.65-5.03)  M/mm3


 


Hgb    (10.1-14.3)  gm/dl


 


Hct    (30.3-42.9)  %


 


MCV    (79-97)  fl


 


MCH    (28-32)  pg


 


MCHC    (30-34)  %


 


RDW    (13.2-15.2)  %


 


Plt Count    (140-440)  K/mm3


 


Lymph % (Auto)    (13.4-35.0)  %


 


Mono % (Auto)    (0.0-7.3)  %


 


Eos % (Auto)    (0.0-4.3)  %


 


Baso % (Auto)    (0.0-1.8)  %


 


Lymph #    (1.2-5.4)  K/mm3


 


Mono #    (0.0-0.8)  K/mm3


 


Eos #    (0.0-0.4)  K/mm3


 


Baso #    (0.0-0.1)  K/mm3


 


Seg Neutrophils %    (40.0-70.0)  %


 


Seg Neutrophils #    (1.8-7.7)  K/mm3


 


Sodium    (137-145)  mmol/L


 


Potassium    (3.6-5.0)  mmol/L


 


Chloride    ()  mmol/L


 


Carbon Dioxide    (22-30)  mmol/L


 


Anion Gap    mmol/L


 


BUN    (7-17)  mg/dL


 


Creatinine    (0.7-1.2)  mg/dL


 


Estimated GFR    ml/min


 


BUN/Creatinine Ratio    %


 


Glucose    ()  mg/dL


 


Calcium    (8.4-10.2)  mg/dL


 


Phosphorus   4.00  (2.5-4.5)  mg/dL


 


Magnesium   1.90  (1.7-2.3)  mg/dL


 


Total Bilirubin    (0.1-1.2)  mg/dL


 


Direct Bilirubin    (0-0.2)  mg/dL


 


Indirect Bilirubin    mg/dL


 


AST    (5-40)  units/L


 


ALT    (7-56)  units/L


 


Alkaline Phosphatase    ()  units/L


 


Total Creatine Kinase  118   ()  units/L


 


CK-MB (CK-2)  3.8   (0.0-4.0)  ng/mL


 


CK-MB (CK-2) Rel Index  3.2   (0-4)  


 


Troponin T  < 0.010   (0.00-0.029)  ng/mL


 


Total Protein    (6.3-8.2)  g/dL


 


Albumin    (3.9-5)  g/dL


 


Albumin/Globulin Ratio    %


 


HCG, Qual    (Negative)  


 


Urine Color    (Yellow)  


 


Urine Turbidity    (Clear)  


 


Urine pH    (5.0-7.0)  


 


Ur Specific Gravity    (1.003-1.030)  


 


Urine Protein    (Negative)  mg/dL


 


Urine Glucose (UA)    (Negative)  mg/dL


 


Urine Ketones    (Negative)  mg/dL


 


Urine Blood    (Negative)  


 


Urine Nitrite    (Negative)  


 


Urine Bilirubin    (Negative)  


 


Urine Urobilinogen    (<2.0)  mg/dL


 


Ur Leukocyte Esterase    (Negative)  


 


Urine WBC (Auto)    (0.0-6.0)  /HPF


 


Urine RBC (Auto)    (0.0-6.0)  /HPF


 


U Epithel Cells (Auto)    (0-13.0)  /HPF


 


Urine Bacteria (Auto)    (Negative)  /HPF


 


Amorphous Crystals    


 


Salicylates    (2.8-20.0)  mg/dL


 


Urine Opiates Screen    


 


Urine Methadone Screen    


 


Acetaminophen    (10.0-30.0)  ug/mL


 


Ur Barbiturates Screen    


 


Ur Phencyclidine Scrn    


 


Ur Amphetamines Screen    


 


U Benzodiazepines Scrn    


 


Urine Cocaine Screen    


 


U Marijuana (THC) Screen    


 


Drugs of Abuse Note    


 


Plasma/Serum Alcohol    (0-0.07)  %














- EKG Data


-: EKG Interpreted by Me


EKG shows normal: sinus rhythm, axis (qrs 66), QRS complexes (80), ST-T waves (

no stemi/t inv)


Rate: normal (94)





- EKG Data


When compared to previous EKG there are: no significant change





- Medical Decision Making





Patient supposedly attempted suicide by overdosing on pills postdates in the ER 

she does not want to die.  Labs reveal any acute abnormality except 

methamphetamine use.  1013 and transfer forms signed and patient is medically 

clear for inpatient psychiatric treatment





- Differential Diagnosis


overdose, schizophrenia, suicidal


Critical Care Time: No


Critical care attestation.: 


If time is entered above; I have spent that time in minutes in the direct care 

of this critically ill patient, excluding procedure time.








ED Disposition


Clinical Impression: 


 Methamphetamine abuse, Suicidal ideation, Schizoaffective disorder, Medical 

clearance for psychiatric admission





Disposition: DC/TX-65 PSY HOSP/PSY UNIT


Is pt being admited?: No


Condition: Stable


Time of Disposition: 05:59 (awaiting acceptance)

## 2018-03-14 NOTE — XRAY REPORT
FINAL REPORT



EXAM:  XR CHEST 1V AP



HISTORY:  Chest pain 



TECHNIQUE:  Single AP portable radiograph of the chest was

obtained. 



PRIORS:  None.



FINDINGS:  

There are no focal consolidations to suggest pneumonia.  No large

pleural effusion. No pneumothorax. Tortuosity and mild

atherosclerotic vascular calcifications within the thoracic

aorta. Cardiac silhouette and mediastinal structures are

unremarkable. No acute osseous abnormality identified. 



IMPRESSION:  

No radiographic evidence of acute cardiopulmonary disease. 



Mild atherosclerotic vascular calcifications.
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
## 2018-03-15 NOTE — PROGRESS NOTE
Subjective





- Reason for Consult


Consult date: 03/15/18


Reason for consult: Psychiatry Follow-up





- Chief Complaint


Chief complaint: 


"I don't want to talk"





47 year old  female who presents to [facility] for suicide attempt via 

overdose. The patient refused to talk during the interview.  








Mental Status Exam





- Vital signs


 Last Vital Signs











Temp  98.8 F   03/15/18 11:00


 


Pulse  69   03/15/18 11:00


 


Resp  18   03/15/18 11:00


 


BP  107/52   03/15/18 11:00


 


Pulse Ox  98   03/15/18 11:00














- Exam


Narrative exam: 


Unable to complete MSE, the patient refused to cooperate during the interview.








Assessment and Plan


Impression: Hx of Bipolar DO. Substance Use DO (amphetamines). The patient 

refused to talk during the interview. The patient attempted suicide by overdose.





DDx: R/O Substance Induced Mood DO





Recommendation/Plan: Continue 1013 with placement to [facility] today.

## 2018-04-22 NOTE — XRAY REPORT
FINAL REPORT



EXAM:  XR CHEST 1V AP



HISTORY:  chest pain 



TECHNIQUE:  Frontal chest x-ray



Comparison: 03/14/2018



FINDINGS:  

Heart size is normal.



Lungs are clear and well expanded without focal infiltrate or

consolidation.



Imaged axial skeleton is unremarkable.



IMPRESSION:  

No acute cardiopulmonary disease.



No pneumothorax identified.

## 2018-04-22 NOTE — EMERGENCY DEPARTMENT REPORT
ED Chest Pain HPI





- General


Chief Complaint: Chest Pain


Stated Complaint: CHEST PAIN


Time Seen by Provider: 18 18:10


Source: patient


Mode of arrival: Stretcher


Limitations: No Limitations





- History of Present Illness


MD Complaint: chest pain


-: Gradual, This afternoon


Onset: during rest


Pain Location: left chest


Pain Radiation: none


Severity: moderate


Severity scale (0 -10): 5


Quality: tightness, heaviness, similar to prior MI


Consistency: constant


Improves With: nothing


Worsens With: nothing


Context: other (Recently moved from [place])


Other Symptoms: denies: cough


Treatments Prior to Arrival: none





- Related Data


 Home Medications











 Medication  Instructions  Recorded  Confirmed  Last Taken


 


hydrOXYzine PAMOATE [Vistaril] 100 mg PO BID 17 Unknown


 


risperiDONE [RisperDAL] 2 mg PO QHS 17 Unknown








 Previous Rx's











 Medication  Instructions  Recorded  Last Taken  Type


 


Sulfamethoxazole/Trimethoprim 1 each PO BID #14 tablet 17 Unknown Rx





[Bactrim DS TAB]    


 


Amoxicillin/K Clav Tab [Augmentin 1 tab PO Q12HR #20 tab 18 Unknown Rx





875 mg]    


 


Ciprofloxacin 0.2%(Nf) 4 drops AS TID 7 Days  droperette 18 Unknown Rx





[Ciprofloxacin Otic 0.2%(Nf)]    











 Allergies











Allergy/AdvReac Type Severity Reaction Status Date / Time


 


No Known Allergies Allergy   Verified 17 13:01














Heart Score





- HEART Score


EKG: Non-specific


Age: 45-65


Risk factors: > 3 risk factors or hx of atherosclerotic disease


Troponin: < normal limit





- Critical Actions


Critical Actions: 4-6 pts:12-16.6% risk of adverse cardiac event. Should be 

admitted





ED Review of Systems


ROS: 


Stated complaint: CHEST PAIN


Other details as noted in HPI





Comment: All other systems reviewed and negative


Constitutional: no symptoms reported


Eyes: as per HPI.  denies: vision change


ENT: denies: ear pain


Respiratory: denies: shortness of breath, SOB with exertion


Cardiovascular: chest pain


Endocrine: no symptoms reported


Gastrointestinal: denies: abdominal pain, nausea, vomiting, diarrhea


Genitourinary: denies: urgency, frequency, hematuria


Musculoskeletal: denies: back pain, joint swelling


Skin: denies: rash, lesions, change in color


Neurological: denies: headache, weakness, numbness, paresthesias


Psychiatric: denies: anxiety, depression, auditory hallucinations


Hematological/Lymphatic: denies: easy bleeding, easy bruising





ED Past Medical Hx





- Past Medical History


Hx Heart Attack/AMI: Yes (pt stated)


Hx Diabetes: Yes (gestational)


Hx Seizures: Yes


Hx Psychiatric Treatment: Yes (depression / schizophrenia)


Additional medical history: High cholesterol





- Surgical History


Hx Coronary Stent: Yes (patient states)


Hx Breast Surgery: Yes (breast augmentation)


Additional Surgical History: left leg surgery.   x 2





- Social History


Smoking Status: Current Every Day Smoker


Substance Use Type: Marijuana





- Medications


Home Medications: 


 Home Medications











 Medication  Instructions  Recorded  Confirmed  Last Taken  Type


 


hydrOXYzine PAMOATE [Vistaril] 100 mg PO BID 17 Unknown History


 


risperiDONE [RisperDAL] 2 mg PO QHS 17 Unknown History


 


Sulfamethoxazole/Trimethoprim 1 each PO BID #14 tablet 17  Unknown Rx





[Bactrim DS TAB]     


 


Amoxicillin/K Clav Tab [Augmentin 1 tab PO Q12HR #20 tab 18  Unknown Rx





875 mg]     


 


Ciprofloxacin 0.2%(Nf) 4 drops AS TID 7 Days  droperette 18  Unknown Rx





[Ciprofloxacin Otic 0.2%(Nf)]     














ED Physical Exam





- General


Limitations: No Limitations


General appearance: alert, in no apparent distress





- Head


Head exam: Present: atraumatic, normocephalic, normal inspection





- Eye


Eye exam: Present: normal appearance, PERRL, EOMI


Pupils: Present: normal accommodation





- ENT


ENT exam: Present: normal exam, normal orophraynx





- Neck


Neck exam: Present: normal inspection, full ROM.  Absent: tenderness





- Respiratory


Respiratory exam: Present: normal lung sounds bilaterally.  Absent: respiratory 

distress, wheezes, rhonchi





- Cardiovascular


Cardiovascular Exam: Present: regular rate, normal rhythm





- GI/Abdominal


GI/Abdominal exam: Present: soft, normal bowel sounds.  Absent: distended, 

tenderness, organomegaly





- Extremities Exam


Extremities exam: Present: other (Cellulitis left lower leg.)





- Back Exam


Back exam: Present: normal inspection, full ROM





- Neurological Exam


Neurological exam: Present: alert, oriented X3, CN II-XII intact





- Psychiatric


Psychiatric exam: Present: normal affect, normal mood





- Skin


Skin exam: Present: warm, dry, erythema (Left lower leg.)





ED Course





 Vital Signs











  18





  05:47 06:19


 


Temperature 98.9 F 98.9 F


 


Pulse Rate 101 H 82


 


Respiratory 18 16





Rate  


 


Blood Pressure 133/76 133/76


 


O2 Sat by Pulse 99 98





Oximetry  














HÉCTOR score





- Héctor Score


Age > 65: (0) No


Aspirin use within the Past 7 Days: (0) No


3 or more CAD Risk Factors: (1) Yes


2 or more Angina events in past 24 hrs: (1) Yes


Known CAD with more than 50% Stenosis: (0) No


Elevated Cardiac Markers: (0) No


ST Deviation Greater than 0.5mm: (0) No


HÉCTOR Score: 2





ED Medical Decision Making





- Lab Data


Result diagrams: 


 18 06:27





 18 06:27





- EKG Data


EKG shows normal: sinus rhythm


Rate: tachycardia





- EKG Data


When compared to previous EKG there are: previous EKG unavailable


Interpretation: no acute changes (Nonspecific ST and T wave changes.), 

nonspecific ST-T wave juan carlos





- Radiology Data


Radiology results: report reviewed


Critical care attestation.: 


If time is entered above; I have spent that time in minutes in the direct care 

of this critically ill patient, excluding procedure time.








ED Disposition


Clinical Impression: 


 Chest pain





Disposition: -09 OP ADMIT IP TO THIS HOSP


Is pt being admited?: Yes


Does the pt Need Aspirin: Yes


Condition: Stable


Instructions:  Chest Pain (ED)


Referrals: 


DAKOTAH WANG MD [Primary Care Provider] - 3-5 Days

## 2018-04-22 NOTE — HISTORY AND PHYSICAL REPORT
History of Present Illness


Date of examination: 18


History of present illness: 


47-year-old woman with history of  bipolar, schizophrenia, coronary artery 

disease comes emergency room with complaints of chest pain located in the left 

substernal area that started 1 week ago.  she described the pain as as a heavy, 

sometimes sharp sensation, intermittent nature listed in less than 5 minutes, 

intensity 5/10, radiating to the jaw and left shoulder.  She cannot identify 

exacerbating or relieving factor.  Admits to nausea and vomiting, shortness of 

shortness of breath, palpitation, no diaphoresis.  Also complaining of having 

suicidal thoughts earlier today, no plans.  She has a history of MRSA, ulcer on 

the left leg which was healed but has really opened over the last 1 week


Review of systems


Constitutional: no  weight loss, chills


Ears, eyes, nose, mouth and throat: no nasal congestion, no nasal discharge, no 

sinus pressure, no vision change, no red eye.


Neck: No neck pain or rigidity.


Cardiovascular:+chest pain, palpitations


Respiratory: No cough, shortness of breath


Gastrointestinal: no abdominal pain, hematochezia


Genitourinary : no dysuria, frequency , no hematuria


Musculoskeletal: no joint swelling or muscle ache 


Integumentary: no rash, no pruritis


Neurological: no parathesias, no numbness, no focal weakness


Endocrine: no cold or heat intolerance, no polyuria or polydipsia


Hematologic/Lymphatic: no easy bruising, no easy bleeding, no gland swelling


Allergic/Immunologic: no urticaria, no angioedema.





PAST MEDICAL HISTORY: bipolar, schizophrenia, coronary artery disease





PAST SURGICAL HISTORY: Left leg, -, breast augmentation





SOCIAL HISTORY: Works 7 cigarettes a day, social alcohol, no drugs





FAMILY HISTORY: Hypertension








Medications and Allergies


 Allergies











Allergy/AdvReac Type Severity Reaction Status Date / Time


 


No Known Allergies Allergy   Verified 17 13:01











 Home Medications











 Medication  Instructions  Recorded  Confirmed  Last Taken  Type


 


hydrOXYzine PAMOATE [Vistaril] 100 mg PO BID 17 Unknown History


 


risperiDONE [RisperDAL] 2 mg PO QHS 17 Unknown History


 


Sulfamethoxazole/Trimethoprim 1 each PO BID #14 tablet 17  Unknown Rx





[Bactrim DS TAB]     


 


Amoxicillin/K Clav Tab [Augmentin 1 tab PO Q12HR #20 tab 18  Unknown Rx





875 mg]     


 


Ciprofloxacin 0.2%(Nf) 4 drops AS TID 7 Days  droperette 18  Unknown Rx





[Ciprofloxacin Otic 0.2%(Nf)]     














Exam





- Physical Exam


Narrative exam: 


Gen. appearance: Patient lying in bed, no apparent distress


HEENT: Normocephalic, atraumatic, pupils equally round and reactive to light, 

extraocular movement intact, and no sclericterus,. No JVD or thyromegaly or 

nodule,neck supple, no carotid bruit ,mucous membranes moist, no exudate or 

erythema


Heart: S1, S2, regular rate and rhythm


Lungs: Clear to auscultation bilaterally, breathing comfortable


Abdomen: Positive bowel sounds, nontender, nondistended, no organomegaly


Extremity: No edema, cyanosis, clubbing


Skin: Left leg ulcer, with surrounding erythema, No rash, nodules, warm, dry


Neuro: Oriented 3, cranial nerves II-12 intact, speech is fluent, motor and 

sensory intact








- Constitutional


Vitals: 


 











Temp Pulse Resp BP Pulse Ox


 


 98.9 F   82   16   133/76   98 


 


 18 06:19  18 06:19  18 06:19  18 06:19  18 06:19














Results





- Labs


CBC & Chem 7: 


 18 06:27





 18 06:27


Labs: 


 Abnormal lab results











  18 Range/Units





  06:27 06:27 06:27 


 


MCH     (28-32)  pg


 


RDW     (13.2-15.2)  %


 


Mono % (Auto)     (0.0-7.3)  %


 


Mono #     (0.0-0.8)  K/mm3


 


Seg Neutrophils %     (40.0-70.0)  %


 


Chloride    96.7 L  ()  mmol/L


 


Creatinine    0.5 L  (0.7-1.2)  mg/dL


 


Total Creatine Kinase     ()  units/L


 


Albumin     (3.9-5)  g/dL


 


Salicylates  < 0.3 L    (2.8-20.0)  mg/dL


 


Acetaminophen   < 5.0 L   (10.0-30.0)  ug/mL














  04/22/18 04/22/18 04/22/18 Range/Units





  06:27 19:02 19:02 


 


MCH  27 L    (28-32)  pg


 


RDW  15.9 H    (13.2-15.2)  %


 


Mono % (Auto)  10.7 H    (0.0-7.3)  %


 


Mono #  1.0 H    (0.0-0.8)  K/mm3


 


Seg Neutrophils %  72.4 H    (40.0-70.0)  %


 


Chloride     ()  mmol/L


 


Creatinine     (0.7-1.2)  mg/dL


 


Total Creatine Kinase   175 H   ()  units/L


 


Albumin    3.4 L  (3.9-5)  g/dL


 


Salicylates     (2.8-20.0)  mg/dL


 


Acetaminophen     (10.0-30.0)  ug/mL














- Imaging and Cardiology


EKG: image reviewed


Chest x-ray: image reviewed





Assessment and Plan


Assessment


Unstable angina


Left leg cellulitis


Suicide ideation


Coronary artery disease


Schizophrenia


Bipolar


Plan


Admit to medicine


Check cardiac enzymes, obtain stress test


Start IV Vancomycin,  Levaquin, obtain blood cultures, wound culturs


IV morphine, DVT prophylaxis, psych consult

## 2018-04-23 NOTE — PROGRESS NOTE
Assessment and Plan


Assessment and plan: 





Unstable angina, r/o ACS


-serial troponin neg


-s/p NST, report pending


-cont prn iv morphine





Left leg cellulitis with h/o of MRSA


-will cont iv vanc and d/c levaquin


-blood and wound cultures pending


-wound care nurse consulted





H/o of bipolar disorder with suicidal ideation


-cont one-one observation


-psych consulted





Schizophrenia


-psych consulted








Disp: for discharge/transfer when medically stable








History


Interval history: 





Pt continues to complain of LT sided chest pain and requested for morphine, 

which she received in the ED





Hospitalist Physical





- Constitutional


Vitals: 


 











Temp Pulse Resp BP Pulse Ox


 


 98.9 F   72   18   102/49   100 


 


 04/22/18 06:19  04/23/18 10:57  04/23/18 10:57  04/23/18 10:57  04/23/18 13:02











General appearance: Present: no acute distress





- EENT


Eyes: Present: PERRL, EOM intact


ENT: hearing intact, clear oral mucosa





- Neck


Neck: Present: supple





- Respiratory


Respiratory effort: normal


Respiratory: bilateral: CTA





- Cardiovascular


Rhythm: regular


Heart Sounds: Present: S1 & S2





- Extremities


Extremities: No edema





- Abdominal


General gastrointestinal: soft, non-tender, normal bowel sounds





- Integumentary


Integumentary: Present: erythema (on LLE)





- Neurologic


Neurologic: CNII-XII intact





Results





- Labs


CBC & Chem 7: 


 04/23/18 04:19





 04/23/18 04:19


Labs: 


 Laboratory Last Values











WBC  3.4 K/mm3 (4.5-11.0)  L  04/23/18  04:19    


 


RBC  4.05 M/mm3 (3.65-5.03)   04/23/18  04:19    


 


Hgb  11.2 gm/dl (10.1-14.3)   04/23/18  04:19    


 


Hct  33.8 % (30.3-42.9)   04/23/18  04:19    


 


MCV  83 fl (79-97)   04/23/18  04:19    


 


MCH  28 pg (28-32)   04/23/18  04:19    


 


MCHC  33 % (30-34)   04/23/18  04:19    


 


RDW  16.0 % (13.2-15.2)  H  04/23/18  04:19    


 


Plt Count  308 K/mm3 (140-440)   04/23/18  04:19    


 


Lymph % (Auto)  16.3 % (13.4-35.0)   04/22/18  06:27    


 


Mono % (Auto)  Np   04/23/18  04:19    


 


Eos % (Auto)  0.2 % (0.0-4.3)   04/22/18  06:27    


 


Baso % (Auto)  0.4 % (0.0-1.8)   04/22/18  06:27    


 


Lymph #  1.5 K/mm3 (1.2-5.4)   04/22/18  06:27    


 


Mono #  1.0 K/mm3 (0.0-0.8)  H  04/22/18  06:27    


 


Eos #  0.0 K/mm3 (0.0-0.4)   04/22/18  06:27    


 


Baso #  0.0 K/mm3 (0.0-0.1)   04/22/18  06:27    


 


Add Manual Diff  Complete   04/23/18  04:19    


 


Total Counted  100   04/23/18  04:19    


 


Seg Neutrophils %  Np   04/23/18  04:19    


 


Seg Neuts % (Manual)  31.0 % (40.0-70.0)  L  04/23/18  04:19    


 


Band Neutrophils %  0 %  04/23/18  04:19    


 


Lymphocytes % (Manual)  45.0 % (13.4-35.0)  H  04/23/18  04:19    


 


Reactive Lymphs % (Man)  0 %  04/23/18  04:19    


 


Monocytes % (Manual)  13.0 % (0.0-7.3)  H  04/23/18  04:19    


 


Eosinophils % (Manual)  11.0 % (0.0-4.3)  H  04/23/18  04:19    


 


Basophils % (Manual)  0 % (0.0-1.8)   04/23/18  04:19    


 


Metamyelocytes %  0 %  04/23/18  04:19    


 


Myelocytes %  0 %  04/23/18  04:19    


 


Promyelocytes %  0 %  04/23/18  04:19    


 


Blast Cells %  0 %  04/23/18  04:19    


 


Nucleated RBC %  Not Reportable   04/23/18  04:19    


 


Seg Neutrophils #  6.7 K/mm3 (1.8-7.7)   04/22/18  06:27    


 


Seg Neutrophils # Man  1.1 K/mm3 (1.8-7.7)  L  04/23/18  04:19    


 


Band Neutrophils #  0.0 K/mm3  04/23/18  04:19    


 


Lymphocytes # (Manual)  1.5 K/mm3 (1.2-5.4)   04/23/18  04:19    


 


Abs React Lymphs (Man)  0.0 K/mm3  04/23/18  04:19    


 


Monocytes # (Manual)  0.4 K/mm3 (0.0-0.8)   04/23/18  04:19    


 


Eosinophils # (Manual)  0.4 K/mm3 (0.0-0.4)   04/23/18  04:19    


 


Basophils # (Manual)  0.0 K/mm3 (0.0-0.1)   04/23/18  04:19    


 


Metamyelocytes #  0.0 K/mm3  04/23/18  04:19    


 


Myelocytes #  0.0 K/mm3  04/23/18  04:19    


 


Promyelocytes #  0.0 K/mm3  04/23/18  04:19    


 


Blast Cells #  0.0 K/mm3  04/23/18  04:19    


 


WBC Morphology  Not Reportable   04/23/18  04:19    


 


Hypersegmented Neuts  Not Reportable   04/23/18  04:19    


 


Hyposegmented Neuts  Not Reportable   04/23/18  04:19    


 


Hypogranular Neuts  Not Reportable   04/23/18  04:19    


 


Smudge Cells  Not Reportable   04/23/18  04:19    


 


Toxic Granulation  Not Reportable   04/23/18  04:19    


 


Toxic Vacuolation  Not Reportable   04/23/18  04:19    


 


Dohle Bodies  Not Reportable   04/23/18  04:19    


 


Pelger-Huet Anomaly  Not Reportable   04/23/18  04:19    


 


Yamilex Rods  Not Reportable   04/23/18  04:19    


 


Platelet Estimate  Consistent w auto   04/23/18  04:19    


 


Clumped Platelets  Not Reportable   04/23/18  04:19    


 


Plt Clumps, EDTA  Not Reportable   04/23/18  04:19    


 


Large Platelets  Not Reportable   04/23/18  04:19    


 


Giant Platelets  Not Reportable   04/23/18  04:19    


 


Platelet Satelliting  Not Reportable   04/23/18  04:19    


 


Plt Morphology Comment  Not Reportable   04/23/18  04:19    


 


RBC Morphology  Not Reportable   04/23/18  04:19    


 


Dimorphic RBCs  Not Reportable   04/23/18  04:19    


 


Polychromasia  Not Reportable   04/23/18  04:19    


 


Hypochromasia  Not Reportable   04/23/18  04:19    


 


Poikilocytosis  Not Reportable   04/23/18  04:19    


 


Anisocytosis  Not Reportable   04/23/18  04:19    


 


Microcytosis  Not Reportable   04/23/18  04:19    


 


Macrocytosis  Not Reportable   04/23/18  04:19    


 


Spherocytes  Not Reportable   04/23/18  04:19    


 


Pappenheimer Bodies  Not Reportable   04/23/18  04:19    


 


Sickle Cells  Not Reportable   04/23/18  04:19    


 


Target Cells  Not Reportable   04/23/18  04:19    


 


Tear Drop Cells  Not Reportable   04/23/18  04:19    


 


Ovalocytes  Not Reportable   04/23/18  04:19    


 


Helmet Cells  Not Reportable   04/23/18  04:19    


 


Casanova-Holcomb Bodies  Not Reportable   04/23/18  04:19    


 


Cabot Rings  Not Reportable   04/23/18  04:19    


 


Roger Cells  Not Reportable   04/23/18  04:19    


 


Bite Cells  Not Reportable   04/23/18  04:19    


 


Crenated Cell  Not Reportable   04/23/18  04:19    


 


Elliptocytes  Not Reportable   04/23/18  04:19    


 


Acanthocytes (Spur)  Not Reportable   04/23/18  04:19    


 


Rouleaux  Not Reportable   04/23/18  04:19    


 


Hemoglobin C Crystals  Not Reportable   04/23/18  04:19    


 


Schistocytes  Not Reportable   04/23/18  04:19    


 


Malaria parasites  Not Reportable   04/23/18  04:19    


 


Buster Bodies  Not Reportable   04/23/18  04:19    


 


Hem Pathologist Commnt  No   04/23/18  04:19    


 


PT  12.6 Sec. (12.2-14.9)   04/22/18  19:02    


 


INR  0.90  (0.87-1.13)   04/22/18  19:02    


 


APTT  32.8 Sec. (24.2-36.6)   04/22/18  19:02    


 


Sodium  136 mmol/L (137-145)  L  04/23/18  04:19    


 


Potassium  4.0 mmol/L (3.6-5.0)   04/23/18  04:19    


 


Chloride  97.4 mmol/L ()  L  04/23/18  04:19    


 


Carbon Dioxide  29 mmol/L (22-30)   04/23/18  04:19    


 


Anion Gap  14 mmol/L  04/23/18  04:19    


 


BUN  10 mg/dL (7-17)   04/23/18  04:19    


 


Creatinine  0.5 mg/dL (0.7-1.2)  L  04/23/18  04:19    


 


Estimated GFR  > 60 ml/min  04/23/18  04:19    


 


BUN/Creatinine Ratio  20 %  04/23/18  04:19    


 


Glucose  82 mg/dL ()   04/23/18  04:19    


 


Calcium  7.7 mg/dL (8.4-10.2)  L  04/23/18  04:19    


 


Total Bilirubin  0.20 mg/dL (0.1-1.2)   04/22/18  19:02    


 


Direct Bilirubin  < 0.2 mg/dL (0-0.2)   04/22/18  19:02    


 


Indirect Bilirubin  0.0 mg/dL  04/22/18  19:02    


 


AST  21 units/L (5-40)   04/22/18  19:02    


 


ALT  14 units/L (7-56)   04/22/18  19:02    


 


Alkaline Phosphatase  90 units/L ()   04/22/18  19:02    


 


Total Creatine Kinase  142 units/L ()  H  04/23/18  04:19    


 


CK-MB (CK-2)  2.1 ng/mL (0.0-4.0)   04/23/18  04:19    


 


CK-MB (CK-2) Rel Index  1.4  (0-4)   04/23/18  04:19    


 


Troponin T  < 0.010 ng/mL (0.00-0.029)   04/23/18  04:19    


 


Total Protein  6.6 g/dL (6.3-8.2)   04/22/18  19:02    


 


Albumin  3.4 g/dL (3.9-5)  L  04/22/18  19:02    


 


Albumin/Globulin Ratio  1.1 %  04/22/18  19:02    


 


Salicylates  < 0.3 mg/dL (2.8-20.0)  L  04/22/18  06:27    


 


Acetaminophen  < 5.0 ug/mL (10.0-30.0)  L  04/22/18  06:27    


 


Plasma/Serum Alcohol  < 0.01 % (0-0.07)   04/22/18  06:27

## 2018-04-24 NOTE — CONSULTATION
History of Present Illness





- Reason for Consult


Reason for consult: psych problem





- History of Present Psychiatric Illness





Patient was guarded about the clinical interview and stated that she did not 

need to be seen my psychiatry. She also stated that she is not suicidal and 

does not need to be on 1013.  We will reattempt clinical interview is more 

detail tomorrow to see if patient's more cooperative.  At the current time, we 

would recommend that the patient's home psychiatric medications be reconciled 

and reinitiated.





Medications and Allergies


 Allergies











Allergy/AdvReac Type Severity Reaction Status Date / Time


 


egg AdvReac  Itching Verified 04/24/18 08:45











 Home Medications











 Medication  Instructions  Recorded  Confirmed  Last Taken  Type


 


hydrOXYzine PAMOATE [Vistaril] 100 mg PO BID 08/25/17 04/23/18 2 Weeks Ago 

History





    ~04/09/18 


 


risperiDONE [RisperDAL] 2 mg PO QHS 08/25/17 04/23/18 2 Weeks Ago History





    ~04/09/18 


 


Sulfamethoxazole/Trimethoprim 1 each PO BID #14 tablet 08/27/17 04/23/18 2 

Weeks Ago Rx





[Bactrim DS TAB]    ~04/09/18 











Active Meds: 


Active Medications





Acetaminophen (Tylenol)  650 mg PO Q4H PRN


   PRN Reason: Pain MILD(1-3)/Fever >100.5/HA


Vancomycin HCl (Vancomycin/0.45 Ns 1 Gm/250 Ml)  1 gm in 250 mls @ 125 mls/hr 

IV Q8H [facility]


   Last Admin: 04/24/18 11:40 Dose:  250 mls/hr


Methocarbamol (Robaxin)  750 mg PO Q8H PRN


   PRN Reason: Muscle Spasm


Morphine Sulfate (Morphine)  2 mg IV Q4H PRN


   PRN Reason: Pain, Moderate (4-6)


   Last Admin: 04/24/18 16:00 Dose:  2 mg


Nitroglycerin (Nitrostat)  0.4 mg SL .Q5MIN PRN


   PRN Reason: Chest Pain


Ondansetron HCl (Zofran)  4 mg IV Q8H PRN


   PRN Reason: Nausea And Vomiting


   Last Admin: 04/23/18 21:02 Dose:  4 mg


Sodium Chloride (Sodium Chloride Flush Syringe 10 Ml)  10 ml IV BID [facility]


   Last Admin: 04/24/18 11:41 Dose:  10 ml


Sodium Chloride (Sodium Chloride Flush Syringe 10 Ml)  10 ml IV PRN PRN


   PRN Reason: LINE FLUSH


Vancomycin HCl (Vancomycin Pharmacy To Dose)  1 each IV PKCONSULT MICHAEL











Mental Status Exam





- Vital signs


 Last Vital Signs











Temp  98.2 F   04/24/18 04:06


 


Pulse  69   04/24/18 14:00


 


Resp  16   04/24/18 04:06


 


BP  111/45   04/24/18 04:06


 


Pulse Ox  99   04/24/18 04:06














Results


Result Diagrams: 


 04/23/18 04:19





 04/23/18 04:19


All other labs normal.

## 2018-04-24 NOTE — PROGRESS NOTE
Assessment and Plan


Assessment and plan: 


47-year-old woman with history of  bipolar, schizophrenia, coronary artery 

disease comes emergency room with complaints of chest pain located in the left 

substernal area that started 1 week ago.  she described the pain as as a heavy, 

sometimes sharp sensation, intermittent nature listed in less than 5 minutes, 

intensity 5/10, radiating to the jaw and left shoulder.  She cannot identify 

exacerbating or relieving factor.  Admits to nausea and vomiting, shortness of 

shortness of breath, palpitation, no diaphoresis.  Also complaining of having 

suicidal thoughts earlier today, no plans.  She has a history of MRSA, ulcer on 

the left leg which was healed but has really opened over the last 1 week.








Atypical Chest Pain


* Negative stress test


* Likely costochondritis


* Start on Robxain


Left leg cellulitis with h/o of MRSA


-will cont iv vanc and d/c levaquin


-Place on Isolation.


-blood and wound cultures pending


-wound care nurse consulted





H/o of bipolar disorder with suicidal ideation


-cont one-one observation


-patient now yani suicidal ideation


-psych consulted





Schizophrenia


-psych consulted








Disp: await final wound culture and plan discharge accordingly in respect to 

abx. 








History


Interval history: 


Patient seen and examined this am, still reports chest pain. 3/10 IN Intensity, 

left sided supramammary area, sometimes reproducible, sometimes affected by 

deep breath.








Hospitalist Physical





- Physical exam


Narrative exam: 


Gen. appearance: Patient lying in bed, no apparent distress


HEENT: Normocephalic, atraumatic, pupils equally round and reactive to light, 

extraocular movement intact, and no sclericterus,. No JVD or thyromegaly or 

nodule,neck supple, no carotid bruit ,mucous membranes moist, no exudate or 

erythema


Heart: S1, S2, regular rate and rhythm


Lungs: Clear to auscultation bilaterally, breathing comfortable


Abdomen: Positive bowel sounds, nontender, nondistended, no organomegaly


Extremity: No edema, cyanosis, clubbing


Skin: Left leg ulcer, with surrounding erythema, No rash, nodules, warm, dry


MSK: Reproducible tenderness left chest wall


Neuro: Oriented 3, cranial nerves II-12 intact, speech is fluent, motor and 

sensory intact











- Constitutional


Vitals: 


 











Temp Pulse Resp BP Pulse Ox


 


 98.2 F   68   16   111/45   99 


 


 04/24/18 04:06  04/24/18 04:06  04/24/18 04:06  04/24/18 04:06  04/24/18 04:06











General appearance: Present: no acute distress





Results





- Labs


CBC & Chem 7: 


 04/23/18 04:19





 04/23/18 04:19


Labs: 


 Laboratory Last Values











WBC  3.4 K/mm3 (4.5-11.0)  L  04/23/18  04:19    


 


RBC  4.05 M/mm3 (3.65-5.03)   04/23/18  04:19    


 


Hgb  11.2 gm/dl (10.1-14.3)   04/23/18  04:19    


 


Hct  33.8 % (30.3-42.9)   04/23/18  04:19    


 


MCV  83 fl (79-97)   04/23/18  04:19    


 


MCH  28 pg (28-32)   04/23/18  04:19    


 


MCHC  33 % (30-34)   04/23/18  04:19    


 


RDW  16.0 % (13.2-15.2)  H  04/23/18  04:19    


 


Plt Count  308 K/mm3 (140-440)   04/23/18  04:19    


 


Lymph % (Auto)  16.3 % (13.4-35.0)   04/22/18  06:27    


 


Mono % (Auto)  Np   04/23/18  04:19    


 


Eos % (Auto)  0.2 % (0.0-4.3)   04/22/18  06:27    


 


Baso % (Auto)  0.4 % (0.0-1.8)   04/22/18  06:27    


 


Lymph #  1.5 K/mm3 (1.2-5.4)   04/22/18  06:27    


 


Mono #  1.0 K/mm3 (0.0-0.8)  H  04/22/18  06:27    


 


Eos #  0.0 K/mm3 (0.0-0.4)   04/22/18  06:27    


 


Baso #  0.0 K/mm3 (0.0-0.1)   04/22/18  06:27    


 


Add Manual Diff  Complete   04/23/18  04:19    


 


Total Counted  100   04/23/18  04:19    


 


Seg Neutrophils %  Np   04/23/18  04:19    


 


Seg Neuts % (Manual)  31.0 % (40.0-70.0)  L  04/23/18  04:19    


 


Band Neutrophils %  0 %  04/23/18  04:19    


 


Lymphocytes % (Manual)  45.0 % (13.4-35.0)  H  04/23/18  04:19    


 


Reactive Lymphs % (Man)  0 %  04/23/18  04:19    


 


Monocytes % (Manual)  13.0 % (0.0-7.3)  H  04/23/18  04:19    


 


Eosinophils % (Manual)  11.0 % (0.0-4.3)  H  04/23/18  04:19    


 


Basophils % (Manual)  0 % (0.0-1.8)   04/23/18  04:19    


 


Metamyelocytes %  0 %  04/23/18  04:19    


 


Myelocytes %  0 %  04/23/18  04:19    


 


Promyelocytes %  0 %  04/23/18  04:19    


 


Blast Cells %  0 %  04/23/18  04:19    


 


Nucleated RBC %  Not Reportable   04/23/18  04:19    


 


Seg Neutrophils #  6.7 K/mm3 (1.8-7.7)   04/22/18  06:27    


 


Seg Neutrophils # Man  1.1 K/mm3 (1.8-7.7)  L  04/23/18  04:19    


 


Band Neutrophils #  0.0 K/mm3  04/23/18  04:19    


 


Lymphocytes # (Manual)  1.5 K/mm3 (1.2-5.4)   04/23/18  04:19    


 


Abs React Lymphs (Man)  0.0 K/mm3  04/23/18  04:19    


 


Monocytes # (Manual)  0.4 K/mm3 (0.0-0.8)   04/23/18  04:19    


 


Eosinophils # (Manual)  0.4 K/mm3 (0.0-0.4)   04/23/18  04:19    


 


Basophils # (Manual)  0.0 K/mm3 (0.0-0.1)   04/23/18  04:19    


 


Metamyelocytes #  0.0 K/mm3  04/23/18  04:19    


 


Myelocytes #  0.0 K/mm3  04/23/18  04:19    


 


Promyelocytes #  0.0 K/mm3  04/23/18  04:19    


 


Blast Cells #  0.0 K/mm3  04/23/18  04:19    


 


WBC Morphology  Not Reportable   04/23/18  04:19    


 


Hypersegmented Neuts  Not Reportable   04/23/18  04:19    


 


Hyposegmented Neuts  Not Reportable   04/23/18  04:19    


 


Hypogranular Neuts  Not Reportable   04/23/18  04:19    


 


Smudge Cells  Not Reportable   04/23/18  04:19    


 


Toxic Granulation  Not Reportable   04/23/18  04:19    


 


Toxic Vacuolation  Not Reportable   04/23/18  04:19    


 


Dohle Bodies  Not Reportable   04/23/18  04:19    


 


Pelger-Huet Anomaly  Not Reportable   04/23/18  04:19    


 


Yamilex Rods  Not Reportable   04/23/18  04:19    


 


Platelet Estimate  Consistent w auto   04/23/18  04:19    


 


Clumped Platelets  Not Reportable   04/23/18  04:19    


 


Plt Clumps, EDTA  Not Reportable   04/23/18  04:19    


 


Large Platelets  Not Reportable   04/23/18  04:19    


 


Giant Platelets  Not Reportable   04/23/18  04:19    


 


Platelet Satelliting  Not Reportable   04/23/18  04:19    


 


Plt Morphology Comment  Not Reportable   04/23/18  04:19    


 


RBC Morphology  Not Reportable   04/23/18  04:19    


 


Dimorphic RBCs  Not Reportable   04/23/18  04:19    


 


Polychromasia  Not Reportable   04/23/18  04:19    


 


Hypochromasia  Not Reportable   04/23/18  04:19    


 


Poikilocytosis  Not Reportable   04/23/18  04:19    


 


Anisocytosis  Not Reportable   04/23/18  04:19    


 


Microcytosis  Not Reportable   04/23/18  04:19    


 


Macrocytosis  Not Reportable   04/23/18  04:19    


 


Spherocytes  Not Reportable   04/23/18  04:19    


 


Pappenheimer Bodies  Not Reportable   04/23/18  04:19    


 


Sickle Cells  Not Reportable   04/23/18  04:19    


 


Target Cells  Not Reportable   04/23/18  04:19    


 


Tear Drop Cells  Not Reportable   04/23/18  04:19    


 


Ovalocytes  Not Reportable   04/23/18  04:19    


 


Helmet Cells  Not Reportable   04/23/18  04:19    


 


Casanova-Goff Bodies  Not Reportable   04/23/18  04:19    


 


Cabot Rings  Not Reportable   04/23/18  04:19    


 


[place] Cells  Not Reportable   04/23/18  04:19    


 


Bite Cells  Not Reportable   04/23/18  04:19    


 


Crenated Cell  Not Reportable   04/23/18  04:19    


 


Elliptocytes  Not Reportable   04/23/18  04:19    


 


Acanthocytes (Spur)  Not Reportable   04/23/18  04:19    


 


Rouleaux  Not Reportable   04/23/18  04:19    


 


Hemoglobin C Crystals  Not Reportable   04/23/18  04:19    


 


Schistocytes  Not Reportable   04/23/18  04:19    


 


Malaria parasites  Not Reportable   04/23/18  04:19    


 


Buster Bodies  Not Reportable   04/23/18  04:19    


 


Hem Pathologist Commnt  No   04/23/18  04:19    


 


PT  12.6 Sec. (12.2-14.9)   04/22/18  19:02    


 


INR  0.90  (0.87-1.13)   04/22/18  19:02    


 


APTT  32.8 Sec. (24.2-36.6)   04/22/18  19:02    


 


Sodium  136 mmol/L (137-145)  L  04/23/18  04:19    


 


Potassium  4.0 mmol/L (3.6-5.0)   04/23/18  04:19    


 


Chloride  97.4 mmol/L ()  L  04/23/18  04:19    


 


Carbon Dioxide  29 mmol/L (22-30)   04/23/18  04:19    


 


Anion Gap  14 mmol/L  04/23/18  04:19    


 


BUN  10 mg/dL (7-17)   04/23/18  04:19    


 


Creatinine  0.5 mg/dL (0.7-1.2)  L  04/23/18  04:19    


 


Estimated GFR  > 60 ml/min  04/23/18  04:19    


 


BUN/Creatinine Ratio  20 %  04/23/18  04:19    


 


Glucose  82 mg/dL ()   04/23/18  04:19    


 


Calcium  7.7 mg/dL (8.4-10.2)  L  04/23/18  04:19    


 


Total Bilirubin  0.20 mg/dL (0.1-1.2)   04/22/18  19:02    


 


Direct Bilirubin  < 0.2 mg/dL (0-0.2)   04/22/18  19:02    


 


Indirect Bilirubin  0.0 mg/dL  04/22/18  19:02    


 


AST  21 units/L (5-40)   04/22/18  19:02    


 


ALT  14 units/L (7-56)   04/22/18  19:02    


 


Alkaline Phosphatase  90 units/L ()   04/22/18  19:02    


 


Total Creatine Kinase  142 units/L ()  H  04/23/18  04:19    


 


CK-MB (CK-2)  2.1 ng/mL (0.0-4.0)   04/23/18  04:19    


 


CK-MB (CK-2) Rel Index  1.4  (0-4)   04/23/18  04:19    


 


Troponin T  < 0.010 ng/mL (0.00-0.029)   04/23/18  04:19    


 


Total Protein  6.6 g/dL (6.3-8.2)   04/22/18  19:02    


 


Albumin  3.4 g/dL (3.9-5)  L  04/22/18  19:02    


 


Albumin/Globulin Ratio  1.1 %  04/22/18  19:02    


 


Urine Color  Yellow  (Yellow)   04/23/18  16:01    


 


Urine Turbidity  Clear  (Clear)   04/23/18  16:01    


 


Urine pH  8.0  (5.0-7.0)  H  04/23/18  16:01    


 


Ur Specific Gravity  1.006  (1.003-1.030)   04/23/18  16:01    


 


Urine Protein  <15 mg/dl mg/dL (Negative)   04/23/18  16:01    


 


Urine Glucose (UA)  Neg mg/dL (Negative)   04/23/18  16:01    


 


Urine Ketones  Neg mg/dL (Negative)   04/23/18  16:01    


 


Urine Blood  Neg  (Negative)   04/23/18  16:01    


 


Urine Nitrite  Neg  (Negative)   04/23/18  16:01    


 


Urine Bilirubin  Neg  (Negative)   04/23/18  16:01    


 


Urine Urobilinogen  < 2.0 mg/dL (<2.0)   04/23/18  16:01    


 


Ur Leukocyte Esterase  Sm  (Negative)   04/23/18  16:01    


 


Urine WBC (Auto)  2.0 /HPF (0.0-6.0)   04/23/18  16:01    


 


Urine RBC (Auto)  < 1.0 /HPF (0.0-6.0)   04/23/18  16:01    


 


U Epithel Cells (Auto)  2.0 /HPF (0-13.0)   04/23/18  16:01    


 


Salicylates  < 0.3 mg/dL (2.8-20.0)  L  04/22/18  06:27    


 


Urine Opiates Screen  Presumptive negative   04/23/18  16:01    


 


Urine Methadone Screen  Presumptive negative   04/23/18  16:01    


 


Acetaminophen  < 5.0 ug/mL (10.0-30.0)  L  04/22/18  06:27    


 


Ur Barbiturates Screen  Presumptive negative   04/23/18  16:01    


 


Ur Phencyclidine Scrn  Presumptive negative   04/23/18  16:01    


 


Ur Amphetamines Screen  Presumptive negative   04/23/18  16:01    


 


U Benzodiazepines Scrn  Presumptive negative   04/23/18  16:01    


 


Urine Cocaine Screen  Presumptive negative   04/23/18  16:01    


 


U Marijuana (THC) Screen  Presumptive negative   04/23/18  16:01    


 


Drugs of Abuse Note  Disclamer   04/23/18  16:01    


 


Plasma/Serum Alcohol  < 0.01 % (0-0.07)   04/22/18  06:27    














- Imaging and Cardiology


Chest x-ray: image reviewed (NO ACUTE PATHOLOGY )

## 2018-04-25 NOTE — PROGRESS NOTE
Assessment and Plan


Assessment and plan: 


47-year-old woman with history of  bipolar, schizophrenia, coronary artery 

disease comes emergency room with complaints of chest pain located in the left 

substernal area that started 1 week ago.   Also complained of having suicidal 

thoughts earlier.  She has a history of MRSA, ulcer on the left leg which was 

healed but has really opened over the last 1 week.








Atypical Chest Pain


* Negative stress test


* Likely costochondritis


* Start on Robxain


* 


Left leg cellulitis with h/o of MRSA


-continue iv vancomycin 


-contoinue contact isolation for possible MRSAPlace on Isolation.


-blood and wound cultures pending


-wound care nurse consulted





H/o of bipolar disorder with suicidal ideation


-cont one-one observation


-psych following





Schizophrenia


-psych following








Disp: await final wound culture . Currently staph aureus, sensitivity pending. 











History


Interval history: 


Asking for more pain meds,


suicidal








Hospitalist Physical





- Physical exam


Narrative exam: 


General:Not in acute distress, lying in bed


HEENT:Normocephalic, atraumatic


Lungs:Clear to auscultation, no rales , no wheeze


Heart:S1 and S2 regular, no murmurs, rubs or gallop


Abd: soft, non tender, non distended, normal bowel sounds


Ext:no edema, no clubbing or cyanosis


Neuro:Awake,alert,oriented x 3, moves all extremities,


Psych:





- Constitutional


Vitals: 


 











Temp Pulse Resp BP Pulse Ox


 


 98.0 F   74   20   118/43   97 


 


 04/25/18 08:18  04/25/18 08:18  04/25/18 08:18  04/25/18 08:18  04/25/18 08:18











General appearance: Present: no acute distress





Results





- Labs


CBC & Chem 7: 


 04/23/18 04:19





 04/23/18 04:19


Labs: 


 Laboratory Last Values











WBC  3.4 K/mm3 (4.5-11.0)  L  04/23/18  04:19    


 


RBC  4.05 M/mm3 (3.65-5.03)   04/23/18  04:19    


 


Hgb  11.2 gm/dl (10.1-14.3)   04/23/18  04:19    


 


Hct  33.8 % (30.3-42.9)   04/23/18  04:19    


 


MCV  83 fl (79-97)   04/23/18  04:19    


 


MCH  28 pg (28-32)   04/23/18  04:19    


 


MCHC  33 % (30-34)   04/23/18  04:19    


 


RDW  16.0 % (13.2-15.2)  H  04/23/18  04:19    


 


Plt Count  308 K/mm3 (140-440)   04/23/18  04:19    


 


Lymph % (Auto)  16.3 % (13.4-35.0)   04/22/18  06:27    


 


Mono % (Auto)  Np   04/23/18  04:19    


 


Eos % (Auto)  0.2 % (0.0-4.3)   04/22/18  06:27    


 


Baso % (Auto)  0.4 % (0.0-1.8)   04/22/18  06:27    


 


Lymph #  1.5 K/mm3 (1.2-5.4)   04/22/18  06:27    


 


Mono #  1.0 K/mm3 (0.0-0.8)  H  04/22/18  06:27    


 


Eos #  0.0 K/mm3 (0.0-0.4)   04/22/18  06:27    


 


Baso #  0.0 K/mm3 (0.0-0.1)   04/22/18  06:27    


 


Add Manual Diff  Complete   04/23/18  04:19    


 


Total Counted  100   04/23/18  04:19    


 


Seg Neutrophils %  Np   04/23/18  04:19    


 


Seg Neuts % (Manual)  31.0 % (40.0-70.0)  L  04/23/18  04:19    


 


Band Neutrophils %  0 %  04/23/18  04:19    


 


Lymphocytes % (Manual)  45.0 % (13.4-35.0)  H  04/23/18  04:19    


 


Reactive Lymphs % (Man)  0 %  04/23/18  04:19    


 


Monocytes % (Manual)  13.0 % (0.0-7.3)  H  04/23/18  04:19    


 


Eosinophils % (Manual)  11.0 % (0.0-4.3)  H  04/23/18  04:19    


 


Basophils % (Manual)  0 % (0.0-1.8)   04/23/18  04:19    


 


Metamyelocytes %  0 %  04/23/18  04:19    


 


Myelocytes %  0 %  04/23/18  04:19    


 


Promyelocytes %  0 %  04/23/18  04:19    


 


Blast Cells %  0 %  04/23/18  04:19    


 


Nucleated RBC %  Not Reportable   04/23/18  04:19    


 


Seg Neutrophils #  6.7 K/mm3 (1.8-7.7)   04/22/18  06:27    


 


Seg Neutrophils # Man  1.1 K/mm3 (1.8-7.7)  L  04/23/18  04:19    


 


Band Neutrophils #  0.0 K/mm3  04/23/18  04:19    


 


Lymphocytes # (Manual)  1.5 K/mm3 (1.2-5.4)   04/23/18  04:19    


 


Abs React Lymphs (Man)  0.0 K/mm3  04/23/18  04:19    


 


Monocytes # (Manual)  0.4 K/mm3 (0.0-0.8)   04/23/18  04:19    


 


Eosinophils # (Manual)  0.4 K/mm3 (0.0-0.4)   04/23/18  04:19    


 


Basophils # (Manual)  0.0 K/mm3 (0.0-0.1)   04/23/18  04:19    


 


Metamyelocytes #  0.0 K/mm3  04/23/18  04:19    


 


Myelocytes #  0.0 K/mm3  04/23/18  04:19    


 


Promyelocytes #  0.0 K/mm3  04/23/18  04:19    


 


Blast Cells #  0.0 K/mm3  04/23/18  04:19    


 


WBC Morphology  Not Reportable   04/23/18  04:19    


 


Hypersegmented Neuts  Not Reportable   04/23/18  04:19    


 


Hyposegmented Neuts  Not Reportable   04/23/18  04:19    


 


Hypogranular Neuts  Not Reportable   04/23/18  04:19    


 


Smudge Cells  Not Reportable   04/23/18  04:19    


 


Toxic Granulation  Not Reportable   04/23/18  04:19    


 


Toxic Vacuolation  Not Reportable   04/23/18  04:19    


 


Dohle Bodies  Not Reportable   04/23/18  04:19    


 


Pelger-Huet Anomaly  Not Reportable   04/23/18  04:19    


 


Yamilex Rods  Not Reportable   04/23/18  04:19    


 


Platelet Estimate  Consistent w auto   04/23/18  04:19    


 


Clumped Platelets  Not Reportable   04/23/18  04:19    


 


Plt Clumps, EDTA  Not Reportable   04/23/18  04:19    


 


Large Platelets  Not Reportable   04/23/18  04:19    


 


Giant Platelets  Not Reportable   04/23/18  04:19    


 


Platelet Satelliting  Not Reportable   04/23/18  04:19    


 


Plt Morphology Comment  Not Reportable   04/23/18  04:19    


 


RBC Morphology  Not Reportable   04/23/18  04:19    


 


Dimorphic RBCs  Not Reportable   04/23/18  04:19    


 


Polychromasia  Not Reportable   04/23/18  04:19    


 


Hypochromasia  Not Reportable   04/23/18  04:19    


 


Poikilocytosis  Not Reportable   04/23/18  04:19    


 


Anisocytosis  Not Reportable   04/23/18  04:19    


 


Microcytosis  Not Reportable   04/23/18  04:19    


 


Macrocytosis  Not Reportable   04/23/18  04:19    


 


Spherocytes  Not Reportable   04/23/18  04:19    


 


Pappenheimer Bodies  Not Reportable   04/23/18  04:19    


 


Sickle Cells  Not Reportable   04/23/18  04:19    


 


Target Cells  Not Reportable   04/23/18  04:19    


 


Tear Drop Cells  Not Reportable   04/23/18  04:19    


 


Ovalocytes  Not Reportable   04/23/18  04:19    


 


Helmet Cells  Not Reportable   04/23/18  04:19    


 


Casanova-[place] Bodies  Not Reportable   04/23/18  04:19    


 


Cabot Rings  Not Reportable   04/23/18  04:19    


 


[place] Cells  Not Reportable   04/23/18  04:19    


 


Bite Cells  Not Reportable   04/23/18  04:19    


 


Crenated Cell  Not Reportable   04/23/18  04:19    


 


Elliptocytes  Not Reportable   04/23/18  04:19    


 


Acanthocytes (Spur)  Not Reportable   04/23/18  04:19    


 


Rouleaux  Not Reportable   04/23/18  04:19    


 


Hemoglobin C Crystals  Not Reportable   04/23/18  04:19    


 


Schistocytes  Not Reportable   04/23/18  04:19    


 


Malaria parasites  Not Reportable   04/23/18  04:19    


 


Buster Bodies  Not Reportable   04/23/18  04:19    


 


Hem Pathologist Commnt  No   04/23/18  04:19    


 


PT  12.6 Sec. (12.2-14.9)   04/22/18  19:02    


 


INR  0.90  (0.87-1.13)   04/22/18  19:02    


 


APTT  32.8 Sec. (24.2-36.6)   04/22/18  19:02    


 


Sodium  136 mmol/L (137-145)  L  04/23/18  04:19    


 


Potassium  4.0 mmol/L (3.6-5.0)   04/23/18  04:19    


 


Chloride  97.4 mmol/L ()  L  04/23/18  04:19    


 


Carbon Dioxide  29 mmol/L (22-30)   04/23/18  04:19    


 


Anion Gap  14 mmol/L  04/23/18  04:19    


 


BUN  10 mg/dL (7-17)   04/23/18  04:19    


 


Creatinine  0.5 mg/dL (0.7-1.2)  L  04/23/18  04:19    


 


Estimated GFR  > 60 ml/min  04/23/18  04:19    


 


BUN/Creatinine Ratio  20 %  04/23/18  04:19    


 


Glucose  82 mg/dL ()   04/23/18  04:19    


 


Calcium  7.7 mg/dL (8.4-10.2)  L  04/23/18  04:19    


 


Total Bilirubin  0.20 mg/dL (0.1-1.2)   04/22/18  19:02    


 


Direct Bilirubin  < 0.2 mg/dL (0-0.2)   04/22/18  19:02    


 


Indirect Bilirubin  0.0 mg/dL  04/22/18  19:02    


 


AST  21 units/L (5-40)   04/22/18  19:02    


 


ALT  14 units/L (7-56)   04/22/18  19:02    


 


Alkaline Phosphatase  90 units/L ()   04/22/18  19:02    


 


Total Creatine Kinase  142 units/L ()  H  04/23/18  04:19    


 


CK-MB (CK-2)  2.1 ng/mL (0.0-4.0)   04/23/18  04:19    


 


CK-MB (CK-2) Rel Index  1.4  (0-4)   04/23/18  04:19    


 


Troponin T  < 0.010 ng/mL (0.00-0.029)   04/23/18  04:19    


 


Total Protein  6.6 g/dL (6.3-8.2)   04/22/18  19:02    


 


Albumin  3.4 g/dL (3.9-5)  L  04/22/18  19:02    


 


Albumin/Globulin Ratio  1.1 %  04/22/18  19:02    


 


Urine Color  Yellow  (Yellow)   04/23/18  16:01    


 


Urine Turbidity  Clear  (Clear)   04/23/18  16:01    


 


Urine pH  8.0  (5.0-7.0)  H  04/23/18  16:01    


 


Ur Specific Gravity  1.006  (1.003-1.030)   04/23/18  16:01    


 


Urine Protein  <15 mg/dl mg/dL (Negative)   04/23/18  16:01    


 


Urine Glucose (UA)  Neg mg/dL (Negative)   04/23/18  16:01    


 


Urine Ketones  Neg mg/dL (Negative)   04/23/18  16:01    


 


Urine Blood  Neg  (Negative)   04/23/18  16:01    


 


Urine Nitrite  Neg  (Negative)   04/23/18  16:01    


 


Urine Bilirubin  Neg  (Negative)   04/23/18  16:01    


 


Urine Urobilinogen  < 2.0 mg/dL (<2.0)   04/23/18  16:01    


 


Ur Leukocyte Esterase  Sm  (Negative)   04/23/18  16:01    


 


Urine WBC (Auto)  2.0 /HPF (0.0-6.0)   04/23/18  16:01    


 


Urine RBC (Auto)  < 1.0 /HPF (0.0-6.0)   04/23/18  16:01    


 


U Epithel Cells (Auto)  2.0 /HPF (0-13.0)   04/23/18  16:01    


 


Salicylates  < 0.3 mg/dL (2.8-20.0)  L  04/22/18  06:27    


 


Urine Opiates Screen  Presumptive negative   04/23/18  16:01    


 


Urine Methadone Screen  Presumptive negative   04/23/18  16:01    


 


Acetaminophen  < 5.0 ug/mL (10.0-30.0)  L  04/22/18  06:27    


 


Ur Barbiturates Screen  Presumptive negative   04/23/18  16:01    


 


Ur Phencyclidine Scrn  Presumptive negative   04/23/18  16:01    


 


Ur Amphetamines Screen  Presumptive negative   04/23/18  16:01    


 


U Benzodiazepines Scrn  Presumptive negative   04/23/18  16:01    


 


Urine Cocaine Screen  Presumptive negative   04/23/18  16:01    


 


U Marijuana (THC) Screen  Presumptive negative   04/23/18  16:01    


 


Drugs of Abuse Note  Disclamer   04/23/18  16:01    


 


Plasma/Serum Alcohol  < 0.01 % (0-0.07)   04/22/18  06:27

## 2018-04-25 NOTE — PROGRESS NOTE
Subjective





- Reason for Consult


Consult date: 04/25/18


Reason for consult: Psychiatric Follow-up Evaluation





Mental Status Exam





- Vital signs


 Last Vital Signs











Temp  98.0 F   04/25/18 08:18


 


Pulse  74   04/25/18 08:18


 


Resp  20   04/25/18 08:18


 


BP  118/43   04/25/18 08:18


 


Pulse Ox  97   04/25/18 08:18














Assessment and Plan


At the current time, we would recommend that the patient's home psychiatric 

medications be reconciled and reinitiated.
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
## 2018-04-27 NOTE — PROGRESS NOTE
Subjective





- Reason for Consult


Consult date: 04/27/18


Reason for consult: Psychiatry Follow-up





- Chief Complaint


Chief complaint: 


47 y.o. white female presenting to [facility] for chest pain. This patient is known 

to me. Today the patient is calm and cooperative during the assessment. She 

stated that she look forward to being discharged soon and attending rehab 

services for substance abuse. She denies SI/HI's and AVH's. 








Mental Status Exam





- Vital signs


 Last Vital Signs











Temp  98.8 F   04/27/18 07:09


 


Pulse  62   04/27/18 07:09


 


Resp  16   04/27/18 07:09


 


BP  105/45   04/27/18 07:09


 


Pulse Ox  98   04/27/18 07:09














- Exam


Narrative exam: 


MSE:





 Appearance: calm, cooperative


 Behavior: regular eye contact 


 Speech: regular rate and tone


 Mood: "okay"


 Affect: normal   


 Thought Process: linear


 Thought Content: denies SI/HI's and AVH's


 Motor Activity: sitting up in bed


 Cognition: A/O x3 


 Insight: appropriate 


 Judgment: appropriate

















Assessment and Plan


Impression: Hx of Depression and Substance Abuse. Today the patient is calm and 

cooperative during the assessment. The patient is no threat to self.





Recommendation/Plan: The patient can follow up with [facility] for rehab/

outpatient psy services. Psychiatry sign off.

## 2018-04-27 NOTE — DISCHARGE SUMMARY
Providers





- Providers


Date of Admission: 


04/22/18 21:08





Attending physician: 


DAKOTAH MORSE





 





04/23/18 00:12


psychiatry consult [Consult to Mental Health] [CONS] Routine 


   Reason For Exam: SI


   Place consult to:: psych


   Notified:: y


   Was contact made?: Yes





04/23/18 13:24


Consult to Wound/ET Nurse [CONS] Routine 


   Reason For Exam: wound eval





04/26/18 12:27


Consult to Physician [CONS] Routine 


   Comment: 


   Consulting Provider: KENNEDI CLEARY


   Physician Instructions: 


   Reason For Exam: MRSA left leg wound











Primary care physician: 


DAKOTAH WANG








Hospitalization


Condition: Stable


Disposition: DC-07 LEFT AGAINST MED ADVICE





- Discharge Diagnoses


(1) Left against medical advice


Status: Acute   





Exam





- Constitutional


Vitals: 


 











Temp Pulse Resp BP Pulse Ox


 


 98.8 F   62   16   105/45   98 


 


 04/27/18 07:09  04/27/18 07:09  04/27/18 07:09  04/27/18 07:09  04/27/18 07:09














Plan


Follow up with: 


DAKOTAH WANG MD [Primary Care Provider] - 3-5 Days

## 2018-05-10 ENCOUNTER — HOSPITAL ENCOUNTER (EMERGENCY)
Dept: HOSPITAL 5 - ED | Age: 48
Discharge: LEFT BEFORE DISCHARGE | End: 2018-05-10
Payer: SELF-PAY

## 2018-05-10 DIAGNOSIS — Z53.21: ICD-10-CM

## 2018-05-10 DIAGNOSIS — R50.9: Primary | ICD-10-CM

## 2018-05-14 ENCOUNTER — HOSPITAL ENCOUNTER (EMERGENCY)
Dept: HOSPITAL 5 - ED | Age: 48
LOS: 2 days | Discharge: HOME | End: 2018-05-16
Payer: COMMERCIAL

## 2018-05-14 ENCOUNTER — HOSPITAL ENCOUNTER (EMERGENCY)
Dept: HOSPITAL 5 - ED | Age: 48
Discharge: LEFT BEFORE DISCHARGE | End: 2018-05-14
Payer: SELF-PAY

## 2018-05-14 DIAGNOSIS — F25.9: ICD-10-CM

## 2018-05-14 DIAGNOSIS — Z53.21: ICD-10-CM

## 2018-05-14 DIAGNOSIS — L03.116: Primary | ICD-10-CM

## 2018-05-14 DIAGNOSIS — R45.851: ICD-10-CM

## 2018-05-14 DIAGNOSIS — F15.10: ICD-10-CM

## 2018-05-14 DIAGNOSIS — M86.9: Primary | ICD-10-CM

## 2018-05-14 LAB
BASOPHILS # (AUTO): 0 K/MM3 (ref 0–0.1)
BASOPHILS NFR BLD AUTO: 1.1 % (ref 0–1.8)
EOSINOPHIL # BLD AUTO: 0.2 K/MM3 (ref 0–0.4)
EOSINOPHIL NFR BLD AUTO: 4.5 % (ref 0–4.3)
HCT VFR BLD CALC: 31.8 % (ref 30.3–42.9)
HGB BLD-MCNC: 10.3 GM/DL (ref 10.1–14.3)
LYMPHOCYTES # BLD AUTO: 1.6 K/MM3 (ref 1.2–5.4)
LYMPHOCYTES NFR BLD AUTO: 39.1 % (ref 13.4–35)
MCH RBC QN AUTO: 27 PG (ref 28–32)
MCHC RBC AUTO-ENTMCNC: 32 % (ref 30–34)
MCV RBC AUTO: 84 FL (ref 79–97)
MONOCYTES # (AUTO): 0.4 K/MM3 (ref 0–0.8)
MONOCYTES % (AUTO): 11.1 % (ref 0–7.3)
PLATELET # BLD: 347 K/MM3 (ref 140–440)
RBC # BLD AUTO: 3.81 M/MM3 (ref 3.65–5.03)

## 2018-05-14 PROCEDURE — 80048 BASIC METABOLIC PNL TOTAL CA: CPT

## 2018-05-14 PROCEDURE — 80307 DRUG TEST PRSMV CHEM ANLYZR: CPT

## 2018-05-14 PROCEDURE — 80320 DRUG SCREEN QUANTALCOHOLS: CPT

## 2018-05-14 PROCEDURE — 82550 ASSAY OF CK (CPK): CPT

## 2018-05-14 PROCEDURE — 85652 RBC SED RATE AUTOMATED: CPT

## 2018-05-14 PROCEDURE — 87076 CULTURE ANAEROBE IDENT EACH: CPT

## 2018-05-14 PROCEDURE — 84703 CHORIONIC GONADOTROPIN ASSAY: CPT

## 2018-05-14 PROCEDURE — 87116 MYCOBACTERIA CULTURE: CPT

## 2018-05-14 PROCEDURE — 87186 SC STD MICRODIL/AGAR DIL: CPT

## 2018-05-14 PROCEDURE — 93005 ELECTROCARDIOGRAM TRACING: CPT

## 2018-05-14 PROCEDURE — 73590 X-RAY EXAM OF LOWER LEG: CPT

## 2018-05-14 PROCEDURE — 85025 COMPLETE CBC W/AUTO DIFF WBC: CPT

## 2018-05-14 PROCEDURE — 81001 URINALYSIS AUTO W/SCOPE: CPT

## 2018-05-14 PROCEDURE — G0480 DRUG TEST DEF 1-7 CLASSES: HCPCS

## 2018-05-14 PROCEDURE — 96372 THER/PROPH/DIAG INJ SC/IM: CPT

## 2018-05-14 PROCEDURE — 93010 ELECTROCARDIOGRAM REPORT: CPT

## 2018-05-14 PROCEDURE — 36415 COLL VENOUS BLD VENIPUNCTURE: CPT

## 2018-05-14 PROCEDURE — 99285 EMERGENCY DEPT VISIT HI MDM: CPT

## 2018-05-14 NOTE — EMERGENCY DEPARTMENT REPORT
ED Psych HPI





- General


Chief Complaint: Psych


Stated Complaint: ABSCESS TO LT LEG


Time Seen by Provider: 18 23:24


Source: patient


Mode of arrival: Ambulatory


Limitations: No Limitations





- History of Present Illness


Initial Comments: 





47-year-old female with a past medical history of schizophrenia, depression, 

elevated cholesterol, and left leg surgery presents to the Hospital complains 

of left leg infection, fever, and suicidal ideation.  Patient states she has a 

history of MRSA infection in the left leg in the past.  Patient complains of 

moderate to severe left leg pain that increases with ambulation.  Other past 

month she has had increased pain to the left leg and reports having a fever for 

the past 3 days.  MAXIMUM TEMPERATURE 101.5 and patient took Tylenol.  Patient'

s been feeling suicidal 1 month since being off her Effexor.  Lantus overdose 

on pills or hang herself.  Her primary orthopedic surgeon is affiliated with 

[facility] with last follow-up visit several months ago





- Related Data


 Home Medications











 Medication  Instructions  Recorded  Confirmed  Last Taken


 


hydrOXYzine PAMOATE [Vistaril] 100 mg PO BID 17 2 Weeks Ago





    ~18


 


risperiDONE [RisperDAL] 2 mg PO QHS 17 2 Weeks Ago





    ~18








 Previous Rx's











 Medication  Instructions  Recorded  Last Taken  Type


 


Sulfamethoxazole/Trimethoprim 1 each PO BID #14 tablet 17 2 Weeks Ago Rx





[Bactrim DS TAB]   ~18 











 Allergies











Allergy/AdvReac Type Severity Reaction Status Date / Time


 


egg AdvReac  Itching Verified 18 08:45














ED Review of Systems


ROS: 


Stated complaint: ABSCESS TO LT LEG


Other details as noted in HPI





Comment: All other systems reviewed and negative





ED Past Medical Hx





- Past Medical History


Previous Medical History?: Yes


Hx Heart Attack/AMI: Yes (pt stated)


Hx Congestive Heart Failure: No


Hx Diabetes: Yes (gestational)


Hx Seizures: Yes


Hx Psychiatric Treatment: Yes (depression / schizophrenia)


Hx Asthma: No


Additional medical history: High cholesterol





- Surgical History


Past Surgical History?: Yes


Hx Coronary Stent: Yes (patient states)


Hx Breast Surgery: Yes (breast augmentation)


Additional Surgical History: left leg surgery.   x 2





- Social History


Smoking Status: Current Every Day Smoker





- Medications


Home Medications: 


 Home Medications











 Medication  Instructions  Recorded  Confirmed  Last Taken  Type


 


hydrOXYzine PAMOATE [Vistaril] 100 mg PO BID 17 2 Weeks Ago 

History





    ~18 


 


risperiDONE [RisperDAL] 2 mg PO QHS 17 2 Weeks Ago History





    ~18 


 


Sulfamethoxazole/Trimethoprim 1 each PO BID #14 tablet 17 2 

Weeks Ago Rx





[Bactrim DS TAB]    ~18 














ED Physical Exam





- General


Limitations: No Limitations





- Other


Other exam information: 


General: No limitations, patient is alert in no acute distress


Head exam: Atraumatic, normocephalic


Eyes exam: Normal appearance


ENT: Moist mucous membrane, normal oropharynx


Neck exam: Normal inspection, full range of motion, no meningismus nontender


Respiratory exam: Clear to auscultation bilateral, no wheezes, rales, crackles


Cardiovascular: Normal rate and rhythm, normal heart sounds


Abdomen: Soft, nondistended, and  nontender, with normal bowel sounds, no 

rebound, or guarding


Extremity: Full range of motion normal inspection no deformity


Back: Normal Inspection, full range of motion, no tenderness


Neurologic: Alert, oriented x3, cranial nerves intact, no motor or sensory 

deficit


Psychiatric: normal affect, normal mood


Skin: Left mid anterior tibia sore with a yellow crusted.  Measuring 1 cm in 

diameter and surrounding erythema measuring 3 cm in diameter.  Tenderness to 

palpation.  No purulent discharge noted on exam.  No  leg or foot swelling.  2+ 

DP pulse





ED Course


 Vital Signs











  18





  22:59 23:30 23:50


 


Temperature 97.7 F 97.8 F 


 


Pulse Rate 95 H 77 


 


Respiratory 17 16 16





Rate   


 


Blood Pressure 121/64  


 


Blood Pressure  115/58 





[Left]   


 


O2 Sat by Pulse  98 





Oximetry   














  05/15/18 05/15/18





  00:20 01:18


 


Temperature  98.1 F


 


Pulse Rate  82


 


Respiratory 16 16





Rate  


 


Blood Pressure  


 


Blood Pressure  115/95





[Left]  


 


O2 Sat by Pulse  98





Oximetry  














ED Medical Decision Making





- Lab Data


Result diagrams: 


 18 23:35





 18 23:35








 Lab Results











  18 Range/Units





  23:35 23:35 23:35 


 


WBC     (4.5-11.0)  K/mm3


 


RBC     (3.65-5.03)  M/mm3


 


Hgb     (10.1-14.3)  gm/dl


 


Hct     (30.3-42.9)  %


 


MCV     (79-97)  fl


 


MCH     (28-32)  pg


 


MCHC     (30-34)  %


 


RDW     (13.2-15.2)  %


 


Plt Count     (140-440)  K/mm3


 


Lymph % (Auto)     (13.4-35.0)  %


 


Mono % (Auto)     (0.0-7.3)  %


 


Eos % (Auto)     (0.0-4.3)  %


 


Baso % (Auto)     (0.0-1.8)  %


 


Lymph #     (1.2-5.4)  K/mm3


 


Mono #     (0.0-0.8)  K/mm3


 


Eos #     (0.0-0.4)  K/mm3


 


Baso #     (0.0-0.1)  K/mm3


 


Seg Neutrophils %     (40.0-70.0)  %


 


Seg Neutrophils #     (1.8-7.7)  K/mm3


 


ESR     (0-20)  mm/Hr


 


Sodium    140  (137-145)  mmol/L


 


Potassium    3.7  (3.6-5.0)  mmol/L


 


Chloride    100.4  ()  mmol/L


 


Carbon Dioxide    24  (22-30)  mmol/L


 


Anion Gap    19  mmol/L


 


BUN    7  (7-17)  mg/dL


 


Creatinine    0.5 L  (0.7-1.2)  mg/dL


 


Estimated GFR    > 60  ml/min


 


BUN/Creatinine Ratio    14  %


 


Glucose    100  ()  mg/dL


 


Calcium    8.4  (8.4-10.2)  mg/dL


 


Total Creatine Kinase     ()  units/L


 


HCG, Qual     (Negative)  


 


Urine Color     (Yellow)  


 


Urine Turbidity     (Clear)  


 


Urine pH     (5.0-7.0)  


 


Ur Specific Gravity     (1.003-1.030)  


 


Urine Protein     (Negative)  mg/dL


 


Urine Glucose (UA)     (Negative)  mg/dL


 


Urine Ketones     (Negative)  mg/dL


 


Urine Blood     (Negative)  


 


Urine Nitrite     (Negative)  


 


Urine Bilirubin     (Negative)  


 


Urine Urobilinogen     (<2.0)  mg/dL


 


Ur Leukocyte Esterase     (Negative)  


 


Urine WBC (Auto)     (0.0-6.0)  /HPF


 


Urine RBC (Auto)     (0.0-6.0)  /HPF


 


U Epithel Cells (Auto)     (0-13.0)  /HPF


 


Urine Mucus     /HPF


 


Salicylates  < 0.3 L    (2.8-20.0)  mg/dL


 


Urine Opiates Screen     


 


Urine Methadone Screen     


 


Acetaminophen   < 5.0 L   (10.0-30.0)  ug/mL


 


Ur Barbiturates Screen     


 


Ur Phencyclidine Scrn     


 


Ur Amphetamines Screen     


 


U Benzodiazepines Scrn     


 


Urine Cocaine Screen     


 


U Marijuana (THC) Screen     


 


Drugs of Abuse Note     


 


Plasma/Serum Alcohol     (0-0.07)  %














  18 Range/Units





  23:35 23:35 23:35 


 


WBC    4.1 L  (4.5-11.0)  K/mm3


 


RBC    3.81  (3.65-5.03)  M/mm3


 


Hgb    10.3  (10.1-14.3)  gm/dl


 


Hct    31.8  (30.3-42.9)  %


 


MCV    84  (79-97)  fl


 


MCH    27 L  (28-32)  pg


 


MCHC    32  (30-34)  %


 


RDW    17.5 H  (13.2-15.2)  %


 


Plt Count    347  (140-440)  K/mm3


 


Lymph % (Auto)    39.1 H  (13.4-35.0)  %


 


Mono % (Auto)    11.1 H  (0.0-7.3)  %


 


Eos % (Auto)    4.5 H  (0.0-4.3)  %


 


Baso % (Auto)    1.1  (0.0-1.8)  %


 


Lymph #    1.6  (1.2-5.4)  K/mm3


 


Mono #    0.4  (0.0-0.8)  K/mm3


 


Eos #    0.2  (0.0-0.4)  K/mm3


 


Baso #    0.0  (0.0-0.1)  K/mm3


 


Seg Neutrophils %    44.2  (40.0-70.0)  %


 


Seg Neutrophils #    1.8  (1.8-7.7)  K/mm3


 


ESR     (0-20)  mm/Hr


 


Sodium     (137-145)  mmol/L


 


Potassium     (3.6-5.0)  mmol/L


 


Chloride     ()  mmol/L


 


Carbon Dioxide     (22-30)  mmol/L


 


Anion Gap     mmol/L


 


BUN     (7-17)  mg/dL


 


Creatinine     (0.7-1.2)  mg/dL


 


Estimated GFR     ml/min


 


BUN/Creatinine Ratio     %


 


Glucose     ()  mg/dL


 


Calcium     (8.4-10.2)  mg/dL


 


Total Creatine Kinase     ()  units/L


 


HCG, Qual   Negative   (Negative)  


 


Urine Color     (Yellow)  


 


Urine Turbidity     (Clear)  


 


Urine pH     (5.0-7.0)  


 


Ur Specific Gravity     (1.003-1.030)  


 


Urine Protein     (Negative)  mg/dL


 


Urine Glucose (UA)     (Negative)  mg/dL


 


Urine Ketones     (Negative)  mg/dL


 


Urine Blood     (Negative)  


 


Urine Nitrite     (Negative)  


 


Urine Bilirubin     (Negative)  


 


Urine Urobilinogen     (<2.0)  mg/dL


 


Ur Leukocyte Esterase     (Negative)  


 


Urine WBC (Auto)     (0.0-6.0)  /HPF


 


Urine RBC (Auto)     (0.0-6.0)  /HPF


 


U Epithel Cells (Auto)     (0-13.0)  /HPF


 


Urine Mucus     /HPF


 


Salicylates     (2.8-20.0)  mg/dL


 


Urine Opiates Screen     


 


Urine Methadone Screen     


 


Acetaminophen     (10.0-30.0)  ug/mL


 


Ur Barbiturates Screen     


 


Ur Phencyclidine Scrn     


 


Ur Amphetamines Screen     


 


U Benzodiazepines Scrn     


 


Urine Cocaine Screen     


 


U Marijuana (THC) Screen     


 


Drugs of Abuse Note     


 


Plasma/Serum Alcohol  0.01    (0-0.07)  %














  05/14/18 05/14/18 05/15/18 Range/Units





  23:51 23:51 01:10 


 


WBC     (4.5-11.0)  K/mm3


 


RBC     (3.65-5.03)  M/mm3


 


Hgb     (10.1-14.3)  gm/dl


 


Hct     (30.3-42.9)  %


 


MCV     (79-97)  fl


 


MCH     (28-32)  pg


 


MCHC     (30-34)  %


 


RDW     (13.2-15.2)  %


 


Plt Count     (140-440)  K/mm3


 


Lymph % (Auto)     (13.4-35.0)  %


 


Mono % (Auto)     (0.0-7.3)  %


 


Eos % (Auto)     (0.0-4.3)  %


 


Baso % (Auto)     (0.0-1.8)  %


 


Lymph #     (1.2-5.4)  K/mm3


 


Mono #     (0.0-0.8)  K/mm3


 


Eos #     (0.0-0.4)  K/mm3


 


Baso #     (0.0-0.1)  K/mm3


 


Seg Neutrophils %     (40.0-70.0)  %


 


Seg Neutrophils #     (1.8-7.7)  K/mm3


 


ESR    17  (0-20)  mm/Hr


 


Sodium     (137-145)  mmol/L


 


Potassium     (3.6-5.0)  mmol/L


 


Chloride     ()  mmol/L


 


Carbon Dioxide     (22-30)  mmol/L


 


Anion Gap     mmol/L


 


BUN     (7-17)  mg/dL


 


Creatinine     (0.7-1.2)  mg/dL


 


Estimated GFR     ml/min


 


BUN/Creatinine Ratio     %


 


Glucose     ()  mg/dL


 


Calcium     (8.4-10.2)  mg/dL


 


Total Creatine Kinase     ()  units/L


 


HCG, Qual     (Negative)  


 


Urine Color  Yellow    (Yellow)  


 


Urine Turbidity  Clear    (Clear)  


 


Urine pH  6.0    (5.0-7.0)  


 


Ur Specific Gravity  1.013    (1.003-1.030)  


 


Urine Protein  <15 mg/dl    (Negative)  mg/dL


 


Urine Glucose (UA)  Neg    (Negative)  mg/dL


 


Urine Ketones  Neg    (Negative)  mg/dL


 


Urine Blood  Neg    (Negative)  


 


Urine Nitrite  Neg    (Negative)  


 


Urine Bilirubin  Neg    (Negative)  


 


Urine Urobilinogen  2.0    (<2.0)  mg/dL


 


Ur Leukocyte Esterase  Sm    (Negative)  


 


Urine WBC (Auto)  4.0    (0.0-6.0)  /HPF


 


Urine RBC (Auto)  4.0    (0.0-6.0)  /HPF


 


U Epithel Cells (Auto)  2.0    (0-13.0)  /HPF


 


Urine Mucus  1+    /HPF


 


Salicylates     (2.8-20.0)  mg/dL


 


Urine Opiates Screen   Presumptive negative   


 


Urine Methadone Screen   Presumptive negative   


 


Acetaminophen     (10.0-30.0)  ug/mL


 


Ur Barbiturates Screen   Presumptive negative   


 


Ur Phencyclidine Scrn   Presumptive negative   


 


Ur Amphetamines Screen   Presumptive positive   


 


U Benzodiazepines Scrn   Presumptive negative   


 


Urine Cocaine Screen   Presumptive negative   


 


U Marijuana (THC) Screen   Presumptive negative   


 


Drugs of Abuse Note   Disclamer   


 


Plasma/Serum Alcohol     (0-0.07)  %














  05/15/18 Range/Units





  01:49 


 


WBC   (4.5-11.0)  K/mm3


 


RBC   (3.65-5.03)  M/mm3


 


Hgb   (10.1-14.3)  gm/dl


 


Hct   (30.3-42.9)  %


 


MCV   (79-97)  fl


 


MCH   (28-32)  pg


 


MCHC   (30-34)  %


 


RDW   (13.2-15.2)  %


 


Plt Count   (140-440)  K/mm3


 


Lymph % (Auto)   (13.4-35.0)  %


 


Mono % (Auto)   (0.0-7.3)  %


 


Eos % (Auto)   (0.0-4.3)  %


 


Baso % (Auto)   (0.0-1.8)  %


 


Lymph #   (1.2-5.4)  K/mm3


 


Mono #   (0.0-0.8)  K/mm3


 


Eos #   (0.0-0.4)  K/mm3


 


Baso #   (0.0-0.1)  K/mm3


 


Seg Neutrophils %   (40.0-70.0)  %


 


Seg Neutrophils #   (1.8-7.7)  K/mm3


 


ESR   (0-20)  mm/Hr


 


Sodium   (137-145)  mmol/L


 


Potassium   (3.6-5.0)  mmol/L


 


Chloride   ()  mmol/L


 


Carbon Dioxide   (22-30)  mmol/L


 


Anion Gap   mmol/L


 


BUN   (7-17)  mg/dL


 


Creatinine   (0.7-1.2)  mg/dL


 


Estimated GFR   ml/min


 


BUN/Creatinine Ratio   %


 


Glucose   ()  mg/dL


 


Calcium   (8.4-10.2)  mg/dL


 


Total Creatine Kinase  243 H  ()  units/L


 


HCG, Qual   (Negative)  


 


Urine Color   (Yellow)  


 


Urine Turbidity   (Clear)  


 


Urine pH   (5.0-7.0)  


 


Ur Specific Gravity   (1.003-1.030)  


 


Urine Protein   (Negative)  mg/dL


 


Urine Glucose (UA)   (Negative)  mg/dL


 


Urine Ketones   (Negative)  mg/dL


 


Urine Blood   (Negative)  


 


Urine Nitrite   (Negative)  


 


Urine Bilirubin   (Negative)  


 


Urine Urobilinogen   (<2.0)  mg/dL


 


Ur Leukocyte Esterase   (Negative)  


 


Urine WBC (Auto)   (0.0-6.0)  /HPF


 


Urine RBC (Auto)   (0.0-6.0)  /HPF


 


U Epithel Cells (Auto)   (0-13.0)  /HPF


 


Urine Mucus   /HPF


 


Salicylates   (2.8-20.0)  mg/dL


 


Urine Opiates Screen   


 


Urine Methadone Screen   


 


Acetaminophen   (10.0-30.0)  ug/mL


 


Ur Barbiturates Screen   


 


Ur Phencyclidine Scrn   


 


Ur Amphetamines Screen   


 


U Benzodiazepines Scrn   


 


Urine Cocaine Screen   


 


U Marijuana (THC) Screen   


 


Drugs of Abuse Note   


 


Plasma/Serum Alcohol   (0-0.07)  %














- Radiology Data


Radiology results: report reviewed





left tib/fib xray: old fractures, no acute findings





- Medical Decision Making





Patient does not have elevated white count or elevated ESR to indicate severe 

infection or osteomyelitis.  Patient be treated for skin infection with Bactrim 

although wound is likely chronic.  No documented fever.  Positive 

methamphetamine abuse without signs of rhabdomyolysis.  Patient is medically 

cleared for psychiatric admission.  1013 and transfer form signed.





- Differential Diagnosis


abscesses/cellulitis, suicidal, depression and schizophrenia


Critical Care Time: No


Critical care attestation.: 


If time is entered above; I have spent that time in minutes in the direct care 

of this critically ill patient, excluding procedure time.








ED Disposition


Clinical Impression: 


 Suicidal ideation, Methamphetamine abuse, Schizoaffective disorder, Medical 

clearance for psychiatric admission





Cellulitis


Qualifiers:


 Site of cellulitis of extremity: lower extremity Laterality: left 





Disposition: DC/TX-[facility] PSY HOSP/PSY UNIT


Is pt being admited?: No


Condition: Stable


Time of Disposition: 03:44 (awaiting acceptance)

## 2018-05-15 LAB
BENZODIAZEPINES SCREEN,URINE: (no result)
BILIRUB UR QL STRIP: (no result)
BLOOD UR QL VISUAL: (no result)
BUN SERPL-MCNC: 7 MG/DL (ref 7–17)
BUN/CREAT SERPL: 14 %
CALCIUM SERPL-MCNC: 8.4 MG/DL (ref 8.4–10.2)
HEMOLYSIS INDEX: 49
METHADONE SCREEN,URINE: (no result)
MUCOUS THREADS #/AREA URNS HPF: (no result) /HPF
OPIATE SCREEN,URINE: (no result)
PH UR STRIP: 6 [PH] (ref 5–7)
PROT UR STRIP-MCNC: (no result) MG/DL
RBC #/AREA URNS HPF: 4 /HPF (ref 0–6)
UROBILINOGEN UR-MCNC: 2 MG/DL (ref ?–2)
WBC #/AREA URNS HPF: 4 /HPF (ref 0–6)

## 2018-05-15 RX ADMIN — SULFAMETHOXAZOLE AND TRIMETHOPRIM SCH EACH: 800; 160 TABLET ORAL at 10:55

## 2018-05-15 RX ADMIN — SULFAMETHOXAZOLE AND TRIMETHOPRIM SCH EACH: 800; 160 TABLET ORAL at 22:44

## 2018-05-15 NOTE — XRAY REPORT
FINAL REPORT



PROCEDURE:  Left tibia and fibula. 



TECHNIQUE:  AP and lateral views.



HISTORY:  pain, hx of surgery and mrsa of left tib fib 



COMPARISON:  No prior studies are available for comparison.



FINDINGS:  

There are old fractures of the distal diaphyses of the tibia and

fibula. There is a fractured prosthetic device located within the

marrow of the distal tibia. There are no acute fractures

identified. The joint spaces appear satisfactory. The soft

tissues are unremarkable.



IMPRESSION:  

Old fractures of the tibia and fibula.

## 2018-05-15 NOTE — CONSULTATION
History of Present Illness





- Reason for Consult


Consult date: 05/15/18


Reason for consult: Mental Health Evaluation


Requesting physician: ADRIAN SNELL





- Chief Complaint


Chief complaint: 


"I don't want to talk"








- History of Present Psychiatric Illness


47-year-old female with a past medical history of depression, elevated 

cholesterol, and left leg surgery presenting with left leg infection, fever, 

and suicidal ideation. This patient is known to me. Today she refuses to talk 

during the interview. 








Medications and Allergies


 Allergies











Allergy/AdvReac Type Severity Reaction Status Date / Time


 


egg AdvReac  Itching Verified 04/24/18 08:45











 Home Medications











 Medication  Instructions  Recorded  Confirmed  Last Taken  Type


 


hydrOXYzine PAMOATE [Vistaril] 100 mg PO BID 08/25/17 04/23/18 2 Weeks Ago 

History





    ~04/09/18 


 


risperiDONE [RisperDAL] 2 mg PO QHS 08/25/17 04/23/18 2 Weeks Ago History





    ~04/09/18 


 


Sulfamethoxazole/Trimethoprim 1 each PO BID #14 tablet 08/27/17 04/23/18 2 

Weeks Ago Rx





[Bactrim DS TAB]    ~04/09/18 











Active Meds: 


Active Medications





Ibuprofen (Motrin)  800 mg PO Q8HR PRN


   PRN Reason: Pain


Trimethoprim/Sulfamethoxazole (Bactrim Ds)  1 each PO Q12HR MICHAEL


   Stop: 05/24/18 10:01


   Last Admin: 05/15/18 10:55 Dose:  1 each











Past psychiatric history





- Past Medical History


Past Medical History: other (Hx of MRSA)


Past Surgical History: Other (Left leg surgery)





- past Psychiatric treatment and history


psychiatric treatment history: 


Hx of depression and substance abuse. Unable to obtain a fam psy hx.








- Social History


Social history: other (Unable to obtain)





Mental Status Exam





- Vital signs


 Last Vital Signs











Temp  98.4 F   05/15/18 10:00


 


Pulse  92 H  05/15/18 10:00


 


Resp  16   05/15/18 10:00


 


BP  119/65   05/15/18 10:00


 


Pulse Ox  100   05/15/18 10:00














- Exam


Narrative exam: 


Unable to complete the MSE because the patient refuses to cooperate.








Results


Result Diagrams: 


 05/14/18 23:35





 05/14/18 23:35


 Abnormal lab results











  05/14/18 05/14/18 05/14/18 Range/Units





  23:35 23:35 23:35 


 


WBC     (4.5-11.0)  K/mm3


 


MCH     (28-32)  pg


 


RDW     (13.2-15.2)  %


 


Lymph % (Auto)     (13.4-35.0)  %


 


Mono % (Auto)     (0.0-7.3)  %


 


Eos % (Auto)     (0.0-4.3)  %


 


Creatinine    0.5 L  (0.7-1.2)  mg/dL


 


Total Creatine Kinase     ()  units/L


 


Salicylates  < 0.3 L    (2.8-20.0)  mg/dL


 


Acetaminophen   < 5.0 L   (10.0-30.0)  ug/mL














  05/14/18 05/15/18 Range/Units





  23:35 01:49 


 


WBC  4.1 L   (4.5-11.0)  K/mm3


 


MCH  27 L   (28-32)  pg


 


RDW  17.5 H   (13.2-15.2)  %


 


Lymph % (Auto)  39.1 H   (13.4-35.0)  %


 


Mono % (Auto)  11.1 H   (0.0-7.3)  %


 


Eos % (Auto)  4.5 H   (0.0-4.3)  %


 


Creatinine    (0.7-1.2)  mg/dL


 


Total Creatine Kinase   243 H  ()  units/L


 


Salicylates    (2.8-20.0)  mg/dL


 


Acetaminophen    (10.0-30.0)  ug/mL








All other labs normal.








Assessment and Plan


Assessment and plan: 


Impression: Hx of Depression and Substance Use DO. Today she refuses to talk 

during the interview. The patient is positive for amphetamines.





Recommendation/Plan: Continue 1013 and follow up in 24 to attempt to assess the 

patient.

## 2018-05-16 VITALS — DIASTOLIC BLOOD PRESSURE: 54 MMHG | SYSTOLIC BLOOD PRESSURE: 118 MMHG

## 2018-05-16 RX ADMIN — SULFAMETHOXAZOLE AND TRIMETHOPRIM SCH EACH: 800; 160 TABLET ORAL at 09:00

## 2018-05-16 NOTE — EMERGENCY DEPARTMENT REPORT
Chief Complaint: Psych


Stated Complaint: ABSCESS TO LT LEG


Time Seen by Provider: 05/14/18 23:24





- Exam


Vital Signs: 


 Vital Signs











  05/14/18 05/14/18 05/14/18





  22:59 23:30 23:50


 


Temperature 97.7 F 97.8 F 


 


Pulse Rate 95 H 77 


 


Respiratory 17 16 16





Rate   


 


Blood Pressure 121/64  


 


Blood Pressure  115/58 





[Left]   


 


O2 Sat by Pulse  98 





Oximetry   














  05/15/18 05/15/18 05/15/18





  00:20 01:18 10:00


 


Temperature  98.1 F 98.4 F


 


Pulse Rate  82 92 H


 


Respiratory 16 16 16





Rate   


 


Blood Pressure   


 


Blood Pressure  115/95 119/65





[Left]   


 


O2 Sat by Pulse  98 98





Oximetry   














  05/15/18 05/16/18





  20:35 08:10


 


Temperature 98.4 F 


 


Pulse Rate 90 75


 


Respiratory  16





Rate  


 


Blood Pressure  


 


Blood Pressure 110/43 118/54





[Left]  


 


O2 Sat by Pulse  98





Oximetry  











MSE screening note: 


Focused history and physical exam performed.


Due to findings the following was ordered:











ED Medical Decision Making





- Lab Data


Result diagrams: 


 05/14/18 23:35





 05/14/18 23:35





 Laboratory Tests











  05/14/18 05/14/18 05/14/18





  23:35 23:35 23:35


 


WBC   


 


RBC   


 


Hgb   


 


Hct   


 


MCV   


 


MCH   


 


MCHC   


 


RDW   


 


Plt Count   


 


Lymph % (Auto)   


 


Mono % (Auto)   


 


Eos % (Auto)   


 


Baso % (Auto)   


 


Lymph #   


 


Mono #   


 


Eos #   


 


Baso #   


 


Seg Neutrophils %   


 


Seg Neutrophils #   


 


ESR   


 


Sodium    140


 


Potassium    3.7


 


Chloride    100.4


 


Carbon Dioxide    24


 


Anion Gap    19


 


BUN    7


 


Creatinine    0.5 L


 


Estimated GFR    > 60


 


BUN/Creatinine Ratio    14


 


Glucose    100


 


Calcium    8.4


 


Total Creatine Kinase   


 


HCG, Qual   


 


Urine Color   


 


Urine Turbidity   


 


Urine pH   


 


Ur Specific Gravity   


 


Urine Protein   


 


Urine Glucose (UA)   


 


Urine Ketones   


 


Urine Blood   


 


Urine Nitrite   


 


Urine Bilirubin   


 


Urine Urobilinogen   


 


Ur Leukocyte Esterase   


 


Urine WBC (Auto)   


 


Urine RBC (Auto)   


 


U Epithel Cells (Auto)   


 


Urine Mucus   


 


Salicylates  < 0.3 L  


 


Urine Opiates Screen   


 


Urine Methadone Screen   


 


Acetaminophen   < 5.0 L 


 


Ur Barbiturates Screen   


 


Ur Phencyclidine Scrn   


 


Ur Amphetamines Screen   


 


U Benzodiazepines Scrn   


 


Urine Cocaine Screen   


 


U Marijuana (THC) Screen   


 


Drugs of Abuse Note   


 


Plasma/Serum Alcohol   














  05/14/18 05/14/18 05/14/18





  23:35 23:35 23:35


 


WBC    4.1 L


 


RBC    3.81


 


Hgb    10.3


 


Hct    31.8


 


MCV    84


 


MCH    27 L


 


MCHC    32


 


RDW    17.5 H


 


Plt Count    347


 


Lymph % (Auto)    39.1 H


 


Mono % (Auto)    11.1 H


 


Eos % (Auto)    4.5 H


 


Baso % (Auto)    1.1


 


Lymph #    1.6


 


Mono #    0.4


 


Eos #    0.2


 


Baso #    0.0


 


Seg Neutrophils %    44.2


 


Seg Neutrophils #    1.8


 


ESR   


 


Sodium   


 


Potassium   


 


Chloride   


 


Carbon Dioxide   


 


Anion Gap   


 


BUN   


 


Creatinine   


 


Estimated GFR   


 


BUN/Creatinine Ratio   


 


Glucose   


 


Calcium   


 


Total Creatine Kinase   


 


HCG, Qual   Negative 


 


Urine Color   


 


Urine Turbidity   


 


Urine pH   


 


Ur Specific Gravity   


 


Urine Protein   


 


Urine Glucose (UA)   


 


Urine Ketones   


 


Urine Blood   


 


Urine Nitrite   


 


Urine Bilirubin   


 


Urine Urobilinogen   


 


Ur Leukocyte Esterase   


 


Urine WBC (Auto)   


 


Urine RBC (Auto)   


 


U Epithel Cells (Auto)   


 


Urine Mucus   


 


Salicylates   


 


Urine Opiates Screen   


 


Urine Methadone Screen   


 


Acetaminophen   


 


Ur Barbiturates Screen   


 


Ur Phencyclidine Scrn   


 


Ur Amphetamines Screen   


 


U Benzodiazepines Scrn   


 


Urine Cocaine Screen   


 


U Marijuana (THC) Screen   


 


Drugs of Abuse Note   


 


Plasma/Serum Alcohol  0.01  














  05/14/18 05/14/18 05/15/18





  23:51 23:51 01:10


 


WBC   


 


RBC   


 


Hgb   


 


Hct   


 


MCV   


 


MCH   


 


MCHC   


 


RDW   


 


Plt Count   


 


Lymph % (Auto)   


 


Mono % (Auto)   


 


Eos % (Auto)   


 


Baso % (Auto)   


 


Lymph #   


 


Mono #   


 


Eos #   


 


Baso #   


 


Seg Neutrophils %   


 


Seg Neutrophils #   


 


ESR    17


 


Sodium   


 


Potassium   


 


Chloride   


 


Carbon Dioxide   


 


Anion Gap   


 


BUN   


 


Creatinine   


 


Estimated GFR   


 


BUN/Creatinine Ratio   


 


Glucose   


 


Calcium   


 


Total Creatine Kinase   


 


HCG, Qual   


 


Urine Color  Yellow  


 


Urine Turbidity  Clear  


 


Urine pH  6.0  


 


Ur Specific Gravity  1.013  


 


Urine Protein  <15 mg/dl  


 


Urine Glucose (UA)  Neg  


 


Urine Ketones  Neg  


 


Urine Blood  Neg  


 


Urine Nitrite  Neg  


 


Urine Bilirubin  Neg  


 


Urine Urobilinogen  2.0  


 


Ur Leukocyte Esterase  Sm  


 


Urine WBC (Auto)  4.0  


 


Urine RBC (Auto)  4.0  


 


U Epithel Cells (Auto)  2.0  


 


Urine Mucus  1+  


 


Salicylates   


 


Urine Opiates Screen   Presumptive negative 


 


Urine Methadone Screen   Presumptive negative 


 


Acetaminophen   


 


Ur Barbiturates Screen   Presumptive negative 


 


Ur Phencyclidine Scrn   Presumptive negative 


 


Ur Amphetamines Screen   Presumptive positive 


 


U Benzodiazepines Scrn   Presumptive negative 


 


Urine Cocaine Screen   Presumptive negative 


 


U Marijuana (THC) Screen   Presumptive negative 


 


Drugs of Abuse Note   Disclamer 


 


Plasma/Serum Alcohol   














  05/15/18





  01:49


 


WBC 


 


RBC 


 


Hgb 


 


Hct 


 


MCV 


 


MCH 


 


MCHC 


 


RDW 


 


Plt Count 


 


Lymph % (Auto) 


 


Mono % (Auto) 


 


Eos % (Auto) 


 


Baso % (Auto) 


 


Lymph # 


 


Mono # 


 


Eos # 


 


Baso # 


 


Seg Neutrophils % 


 


Seg Neutrophils # 


 


ESR 


 


Sodium 


 


Potassium 


 


Chloride 


 


Carbon Dioxide 


 


Anion Gap 


 


BUN 


 


Creatinine 


 


Estimated GFR 


 


BUN/Creatinine Ratio 


 


Glucose 


 


Calcium 


 


Total Creatine Kinase  243 H


 


HCG, Qual 


 


Urine Color 


 


Urine Turbidity 


 


Urine pH 


 


Ur Specific Gravity 


 


Urine Protein 


 


Urine Glucose (UA) 


 


Urine Ketones 


 


Urine Blood 


 


Urine Nitrite 


 


Urine Bilirubin 


 


Urine Urobilinogen 


 


Ur Leukocyte Esterase 


 


Urine WBC (Auto) 


 


Urine RBC (Auto) 


 


U Epithel Cells (Auto) 


 


Urine Mucus 


 


Salicylates 


 


Urine Opiates Screen 


 


Urine Methadone Screen 


 


Acetaminophen 


 


Ur Barbiturates Screen 


 


Ur Phencyclidine Scrn 


 


Ur Amphetamines Screen 


 


U Benzodiazepines Scrn 


 


Urine Cocaine Screen 


 


U Marijuana (THC) Screen 


 


Drugs of Abuse Note 


 


Plasma/Serum Alcohol 








 Vital Signs - 24 hr











  05/15/18 05/16/18





  20:35 08:10


 


Temperature 98.4 F 


 


Pulse Rate 90 75


 


Respiratory  16





Rate  


 


Blood Pressure 110/43 118/54





[Left]  


 


O2 Sat by Pulse  98





Oximetry  














- Medical Decision Making





I spoke with Ms. Valente.  She is currently awake alert and cooperative.  She 

denies suicidal ideation.  She was evaluated by our psychiatrist who rescinded 

1013.  I reviewed psychiatrist note.  Outpatient resources were provided.  

Reviewed vital signs which are stable.  Reviewed labs.





She is appropriate and stable for discharge.





ED Disposition for MSE


Clinical Impression: 


 Suicidal ideation, Methamphetamine abuse, Schizoaffective disorder, Medical 

clearance for psychiatric admission





Cellulitis


Qualifiers:


 Site of cellulitis of extremity: lower extremity Laterality: left 





Disposition: DC-01 TO HOME OR SELFCARE


Is pt being admited?: No


Does the pt Need Aspirin: No


Condition: Stable


Instructions:  Methamphetamine Abuse (ED)


Time of Disposition: 16:49

## 2018-05-16 NOTE — PROGRESS NOTE
Subjective





- Reason for Consult


Reason for consult: recent expression of SI





- Chief Complaint


Chief complaint: 





Subjectively:


Patient acutely denies suicidal thoughts or homicidal thoughts.  No psychosis 

noted.  No louann noted.  Patient appears future oriented with goal-directed 

thinking.  She continues to worry about her infection on her leg; otherwise, 

patient is not presenting with acute mood disorder and is not at acute risk of 

harm to self or others.





Mental status examination:


dressed in hospital gown, calm cooperative, no motor abnormalities, euthymic 

appropriate, normal rate tone, organize reality oriented, no SI or HI no AVH, 

insight judgment limited but chronically impaired, ADLs fair





Plan:


- Rescind 1013


- Follow up with her IOP provider in [place], [place]











Mental Status Exam





- Vital signs


 Last Vital Signs











Temp  98.4 F   05/15/18 20:35


 


Pulse  75   05/16/18 08:10


 


Resp  16   05/16/18 08:10


 


BP  118/54   05/16/18 08:10


 


Pulse Ox  98   05/16/18 08:10

## 2018-05-21 ENCOUNTER — HOSPITAL ENCOUNTER (EMERGENCY)
Dept: HOSPITAL 5 - ED | Age: 48
Discharge: LEFT BEFORE DISCHARGE | End: 2018-05-21
Payer: SELF-PAY

## 2018-05-21 VITALS — DIASTOLIC BLOOD PRESSURE: 48 MMHG | SYSTOLIC BLOOD PRESSURE: 111 MMHG

## 2018-05-21 DIAGNOSIS — Z53.21: ICD-10-CM

## 2018-05-21 DIAGNOSIS — F19.10: Primary | ICD-10-CM

## 2021-03-20 NOTE — EMERGENCY DEPARTMENT REPORT
ED Abdominal Pain HPI





- General


Chief Complaint: Abdominal Pain


Stated Complaint: ABD PAIN


Time Seen by Provider: 21 11:59


Source: patient, EMS


Mode of arrival: Stretcher


Limitations: No Limitations





- History of Present Illness


Initial Comments: 





50-year-old female with history of schizoaffective disorder, polysubstance 

abuse, colon cancer, presents to ED with right lower quadrant abdominal pain x1 

month.  Patient states last year she was diagnosed with colon cancer and had 

colon resection with colostomy placement.  Patient reversal of her colostomy in 

2020.  Patient states since her colostomy reversal surgery, she has been

having pain in that area.  Patient reports this right lower quadrant pain became

worse 2 days ago.  Patient states that she is currently admitted at [facility] 

for depression.  She states that she was admitted there yesterday.  Prior to 

admission to [facility], patient states she was seen at [facility] for 

medical clearance.  Patient states she reported her abdominal pain while in the 

ER at [facility] yesterday, however it was not addressed.  EMS reports 

patient is under voluntary admission.  Patient's paperwork from [facility] shows

admitting diagnosis is "other stimulant dependence, uncomplicated."  When 

questioned about this, patient denies that this is her reason for admission.  

When asked about drug use patient states, "Not that I am aware of.  I was with 

someone and I was drinking."  Patient denies any drug use.  Patient also 

reporting pain to her left lower leg.  States she believes it is due to MRSA.  

Patient states she has had a chronic wound present for several years and has be

en on antibiotics multiple times for it.  Patient states it is leaking pus.


MD Complaint: abdominal pain


-: month(s) (1)


Location: RLQ


Radiation: none


Migration to: no migration


Quality: aching


Consistency: intermittent


Improves With: nothing


Worsens With: nothing


Associated Symptoms: diarrhea.  denies: nausea, vomiting, fever





- Related Data


                                Home Medications











 Medication  Instructions  Recorded  Confirmed  Last Taken


 


hydrOXYzine PAMOATE [Vistaril] 100 mg PO BID 17 2 Weeks Ago





    ~18


 


risperiDONE [RisperDAL] 2 mg PO QHS 17 2 Weeks Ago





    ~18








                                  Previous Rx's











 Medication  Instructions  Recorded  Last Taken  Type


 


Sulfamethoxazole/Trimethoprim 1 each PO BID #14 tablet 17 2 Weeks Ago Rx





[Bactrim DS TAB]   ~18 











                                    Allergies











Allergy/AdvReac Type Severity Reaction Status Date / Time


 


egg AdvReac  Itching Verified 18 08:45














ED Review of Systems


ROS: 


Stated complaint: ABD PAIN


Other details as noted in HPI





Comment: All other systems reviewed and negative


Constitutional: denies: fever


Gastrointestinal: abdominal pain, diarrhea.  denies: nausea, vomiting





ED Past Medical Hx





- Past Medical History


Hx Heart Attack/AMI: Yes (pt stated)


Hx Congestive Heart Failure: No


Hx Diabetes: Yes (gestational)


Hx Seizures: Yes


Hx Psychiatric Treatment: Yes (depression / schizophrenia)


Hx Asthma: No


Additional medical history: High cholesterol, alcohol abuse





- Surgical History


Hx Coronary Stent: Yes (patient states)


Hx Breast Surgery: Yes (breast augmentation)


Additional Surgical History: left leg surgery, colostomy, then colostomy 

reversal.   x 2





- Social History


Smoking Status: Never Smoker


Substance Use Type: Alcohol





- Medications


Home Medications: 


                                Home Medications











 Medication  Instructions  Recorded  Confirmed  Last Taken  Type


 


hydrOXYzine PAMOATE [Vistaril] 100 mg PO BID 17 2 Weeks Ago 

History





    ~18 


 


risperiDONE [RisperDAL] 2 mg PO QHS 17 2 Weeks Ago History





    ~18 


 


Sulfamethoxazole/Trimethoprim 1 each PO BID #14 tablet 17 2 Weeks

Ago Rx





[Bactrim DS TAB]    ~18 














ED Physical Exam





- General


Limitations: No Limitations


General appearance: alert, in no apparent distress





- Head


Head exam: Present: atraumatic, normocephalic





- Eye


Eye exam: Present: normal appearance, EOMI





- ENT


ENT exam: Present: mucous membranes moist





- Neck


Neck exam: Present: normal inspection





- Respiratory


Respiratory exam: Present: normal lung sounds bilaterally.  Absent: respiratory 

distress





- Cardiovascular


Cardiovascular Exam: Present: regular rate, normal rhythm





- GI/Abdominal


GI/Abdominal exam: Present: soft, tenderness (Mild right lower quadrant 

tenderness), other (Scars present on abdomen).  Absent: distended





- Extremities Exam


Extremities exam: Present: other (Small healing wound on left lower leg, able to

express a small amount of purulent discharge from the area)





- Neurological Exam


Neurological exam: Present: alert, oriented X3





- Psychiatric


Psychiatric exam: Present: normal affect, normal mood





- Skin


Skin exam: Present: warm, dry, intact, normal color





ED Course


                                   Vital Signs











  21





  12:16


 


Temperature 97.7 F


 


Pulse Rate 83


 


Respiratory 13





Rate 


 


Blood Pressure 129/86





[Right] 


 


O2 Sat by Pulse 100





Oximetry 














- Reevaluation(s)


Reevaluation #1: 





21 13:34


Pt refusing CT scan.





ED Medical Decision Making





- Lab Data


Result diagrams: 


                                 21 12:24





                                 21 12:24





- Medical Decision Making





50-year-old female presents to ED with abdominal pain since her surgery to 

remove colostomy bag 3 months ago.  Vital signs are stable.  Patient is 

currently at [facility].  Patient is not on a 1013 

EMS reports she is a voluntary admission.  Patient has a sitter at bedside from 

[facility].  Labs are normal.  CT scan ordered to evaluate for bowel obstruction

 since patient has a history of multiple abdominal surgeries and colon cancer.  

Patient refusing CT.  Advised patient that I am unable to fully evaluate her 

abdominal pain given her history without a CT scan.  Patient still refuses and 

decided to sign out AMA.  She has a normal mental status and full decisional 

capacity.  Patient does not appear to be intoxicated at this time.  She 

understands the risks of leaving AMA and has had opportunity to ask questions 

about her medical condition.  Patient has been informed that she may return for 

care at any time and advised to follow-up with her surgeon.


Critical care attestation.: 


If time is entered above; I have spent that time in minutes in the direct care 

of this critically ill patient, excluding procedure time.








ED Disposition


Clinical Impression: 


 Abdominal pain





Disposition: DC-07 LEFT AGAINST MED ADVICE


Is pt being admited?: No


Condition: Stable


Instructions:  Abdominal Pain (ED)


Referrals: 


PRIMARY CARE,MD [Primary Care Provider] - 3-5 Days


Forms:  AMA Form


Time of Disposition: 14:16